# Patient Record
Sex: MALE | Race: WHITE | NOT HISPANIC OR LATINO | Employment: OTHER | URBAN - METROPOLITAN AREA
[De-identification: names, ages, dates, MRNs, and addresses within clinical notes are randomized per-mention and may not be internally consistent; named-entity substitution may affect disease eponyms.]

---

## 2021-03-30 ENCOUNTER — HOSPITAL ENCOUNTER (INPATIENT)
Facility: HOSPITAL | Age: 61
LOS: 1 days | Discharge: HOME | End: 2021-03-31
Attending: EMERGENCY MEDICINE | Admitting: INTERNAL MEDICINE

## 2021-03-30 DIAGNOSIS — K92.2 GASTROINTESTINAL HEMORRHAGE, UNSPECIFIED GASTROINTESTINAL HEMORRHAGE TYPE: Primary | ICD-10-CM

## 2021-03-30 DIAGNOSIS — F10.10 ETOH ABUSE: ICD-10-CM

## 2021-03-30 LAB
ABO GROUP BLD: NORMAL
ALBUMIN SERPL-MCNC: 3.2 G/DL (ref 3.5–5.2)
ALBUMIN/GLOB SERPL: 0.9 G/DL
ALP SERPL-CCNC: 73 U/L (ref 39–117)
ALT SERPL W P-5'-P-CCNC: 56 U/L (ref 1–41)
ANION GAP SERPL CALCULATED.3IONS-SCNC: 11 MMOL/L (ref 5–15)
APTT PPP: 23.1 SECONDS (ref 24–31)
AST SERPL-CCNC: 74 U/L (ref 1–40)
BASOPHILS # BLD AUTO: 0 10*3/MM3 (ref 0–0.2)
BASOPHILS NFR BLD AUTO: 0.1 % (ref 0–1.5)
BILIRUB SERPL-MCNC: 3.5 MG/DL (ref 0–1.2)
BLD GP AB SCN SERPL QL: NEGATIVE
BUN SERPL-MCNC: 31 MG/DL (ref 8–23)
BUN/CREAT SERPL: 52.5 (ref 7–25)
CALCIUM SPEC-SCNC: 8.6 MG/DL (ref 8.6–10.5)
CHLORIDE SERPL-SCNC: 106 MMOL/L (ref 98–107)
CO2 SERPL-SCNC: 23 MMOL/L (ref 22–29)
CREAT SERPL-MCNC: 0.59 MG/DL (ref 0.76–1.27)
DEPRECATED RDW RBC AUTO: 51.2 FL (ref 37–54)
EOSINOPHIL # BLD AUTO: 0 10*3/MM3 (ref 0–0.4)
EOSINOPHIL NFR BLD AUTO: 0 % (ref 0.3–6.2)
ERYTHROCYTE [DISTWIDTH] IN BLOOD BY AUTOMATED COUNT: 14.4 % (ref 12.3–15.4)
ETHANOL UR QL: <0.01 %
GFR SERPL CREATININE-BSD FRML MDRD: 140 ML/MIN/1.73
GFR SERPL CREATININE-BSD FRML MDRD: >150 ML/MIN/1.73
GLOBULIN UR ELPH-MCNC: 3.4 GM/DL
GLUCOSE SERPL-MCNC: 168 MG/DL (ref 65–99)
HCT VFR BLD AUTO: 31.4 % (ref 37.5–51)
HGB BLD-MCNC: 11 G/DL (ref 13–17.7)
INR PPP: 1.21 (ref 0.93–1.1)
LIPASE SERPL-CCNC: 18 U/L (ref 13–60)
LYMPHOCYTES # BLD AUTO: 1 10*3/MM3 (ref 0.7–3.1)
LYMPHOCYTES NFR BLD AUTO: 9.2 % (ref 19.6–45.3)
MAGNESIUM SERPL-MCNC: 1.6 MG/DL (ref 1.6–2.4)
MCH RBC QN AUTO: 35.2 PG (ref 26.6–33)
MCHC RBC AUTO-ENTMCNC: 35.1 G/DL (ref 31.5–35.7)
MCV RBC AUTO: 100.2 FL (ref 79–97)
MONOCYTES # BLD AUTO: 0.9 10*3/MM3 (ref 0.1–0.9)
MONOCYTES NFR BLD AUTO: 8.2 % (ref 5–12)
NEUTROPHILS NFR BLD AUTO: 82.5 % (ref 42.7–76)
NEUTROPHILS NFR BLD AUTO: 9.1 10*3/MM3 (ref 1.7–7)
NRBC BLD AUTO-RTO: 0.1 /100 WBC (ref 0–0.2)
PLATELET # BLD AUTO: 111 10*3/MM3 (ref 140–450)
PMV BLD AUTO: 10.1 FL (ref 6–12)
POTASSIUM SERPL-SCNC: 4.3 MMOL/L (ref 3.5–5.2)
PROT SERPL-MCNC: 6.6 G/DL (ref 6–8.5)
PROTHROMBIN TIME: 13.2 SECONDS (ref 9.6–11.7)
RBC # BLD AUTO: 3.13 10*6/MM3 (ref 4.14–5.8)
RH BLD: POSITIVE
SODIUM SERPL-SCNC: 140 MMOL/L (ref 136–145)
T&S EXPIRATION DATE: NORMAL
WBC # BLD AUTO: 11.1 10*3/MM3 (ref 3.4–10.8)

## 2021-03-30 PROCEDURE — 25010000002 OCTREOTIDE PER 25 MCG: Performed by: EMERGENCY MEDICINE

## 2021-03-30 PROCEDURE — 99222 1ST HOSP IP/OBS MODERATE 55: CPT | Performed by: NURSE PRACTITIONER

## 2021-03-30 PROCEDURE — 99284 EMERGENCY DEPT VISIT MOD MDM: CPT

## 2021-03-30 PROCEDURE — 80074 ACUTE HEPATITIS PANEL: CPT | Performed by: NURSE PRACTITIONER

## 2021-03-30 PROCEDURE — 82105 ALPHA-FETOPROTEIN SERUM: CPT | Performed by: NURSE PRACTITIONER

## 2021-03-30 PROCEDURE — 83735 ASSAY OF MAGNESIUM: CPT | Performed by: EMERGENCY MEDICINE

## 2021-03-30 PROCEDURE — 86850 RBC ANTIBODY SCREEN: CPT | Performed by: EMERGENCY MEDICINE

## 2021-03-30 PROCEDURE — 85730 THROMBOPLASTIN TIME PARTIAL: CPT | Performed by: EMERGENCY MEDICINE

## 2021-03-30 PROCEDURE — 25010000002 CEFTRIAXONE PER 250 MG: Performed by: EMERGENCY MEDICINE

## 2021-03-30 PROCEDURE — 25010000002 THIAMINE PER 100 MG: Performed by: EMERGENCY MEDICINE

## 2021-03-30 PROCEDURE — 25010000002 METOCLOPRAMIDE PER 10 MG: Performed by: EMERGENCY MEDICINE

## 2021-03-30 PROCEDURE — 86900 BLOOD TYPING SEROLOGIC ABO: CPT | Performed by: EMERGENCY MEDICINE

## 2021-03-30 PROCEDURE — 86901 BLOOD TYPING SEROLOGIC RH(D): CPT | Performed by: EMERGENCY MEDICINE

## 2021-03-30 PROCEDURE — 83690 ASSAY OF LIPASE: CPT | Performed by: EMERGENCY MEDICINE

## 2021-03-30 PROCEDURE — 86038 ANTINUCLEAR ANTIBODIES: CPT | Performed by: NURSE PRACTITIONER

## 2021-03-30 PROCEDURE — U0003 INFECTIOUS AGENT DETECTION BY NUCLEIC ACID (DNA OR RNA); SEVERE ACUTE RESPIRATORY SYNDROME CORONAVIRUS 2 (SARS-COV-2) (CORONAVIRUS DISEASE [COVID-19]), AMPLIFIED PROBE TECHNIQUE, MAKING USE OF HIGH THROUGHPUT TECHNOLOGIES AS DESCRIBED BY CMS-2020-01-R: HCPCS | Performed by: INTERNAL MEDICINE

## 2021-03-30 PROCEDURE — 82077 ASSAY SPEC XCP UR&BREATH IA: CPT | Performed by: EMERGENCY MEDICINE

## 2021-03-30 PROCEDURE — 85610 PROTHROMBIN TIME: CPT | Performed by: EMERGENCY MEDICINE

## 2021-03-30 PROCEDURE — 80053 COMPREHEN METABOLIC PANEL: CPT | Performed by: EMERGENCY MEDICINE

## 2021-03-30 PROCEDURE — 86900 BLOOD TYPING SEROLOGIC ABO: CPT

## 2021-03-30 PROCEDURE — 85025 COMPLETE CBC W/AUTO DIFF WBC: CPT | Performed by: EMERGENCY MEDICINE

## 2021-03-30 PROCEDURE — 86901 BLOOD TYPING SEROLOGIC RH(D): CPT

## 2021-03-30 RX ORDER — SODIUM CHLORIDE 0.9 % (FLUSH) 0.9 %
10 SYRINGE (ML) INJECTION EVERY 12 HOURS SCHEDULED
Status: DISCONTINUED | OUTPATIENT
Start: 2021-03-31 | End: 2021-03-31 | Stop reason: HOSPADM

## 2021-03-30 RX ORDER — ALUMINA, MAGNESIA, AND SIMETHICONE 2400; 2400; 240 MG/30ML; MG/30ML; MG/30ML
15 SUSPENSION ORAL EVERY 6 HOURS PRN
Status: DISCONTINUED | OUTPATIENT
Start: 2021-03-30 | End: 2021-03-31 | Stop reason: HOSPADM

## 2021-03-30 RX ORDER — MAGNESIUM SULFATE HEPTAHYDRATE 40 MG/ML
2 INJECTION, SOLUTION INTRAVENOUS AS NEEDED
Status: DISCONTINUED | OUTPATIENT
Start: 2021-03-30 | End: 2021-03-31 | Stop reason: HOSPADM

## 2021-03-30 RX ORDER — POTASSIUM CHLORIDE 20 MEQ/1
40 TABLET, EXTENDED RELEASE ORAL AS NEEDED
Status: DISCONTINUED | OUTPATIENT
Start: 2021-03-30 | End: 2021-03-31 | Stop reason: HOSPADM

## 2021-03-30 RX ORDER — MAGNESIUM SULFATE 1 G/100ML
1 INJECTION INTRAVENOUS AS NEEDED
Status: DISCONTINUED | OUTPATIENT
Start: 2021-03-30 | End: 2021-03-31 | Stop reason: HOSPADM

## 2021-03-30 RX ORDER — OCTREOTIDE ACETATE 500 UG/ML
50 INJECTION, SOLUTION INTRAVENOUS; SUBCUTANEOUS ONCE
Status: COMPLETED | OUTPATIENT
Start: 2021-03-30 | End: 2021-03-30

## 2021-03-30 RX ORDER — SODIUM CHLORIDE 0.9 % (FLUSH) 0.9 %
10 SYRINGE (ML) INJECTION AS NEEDED
Status: DISCONTINUED | OUTPATIENT
Start: 2021-03-30 | End: 2021-03-31 | Stop reason: HOSPADM

## 2021-03-30 RX ORDER — LORAZEPAM 1 MG/1
1 TABLET ORAL EVERY 8 HOURS
Status: DISCONTINUED | OUTPATIENT
Start: 2021-04-01 | End: 2021-03-31

## 2021-03-30 RX ORDER — LABETALOL HYDROCHLORIDE 5 MG/ML
10 INJECTION, SOLUTION INTRAVENOUS EVERY 6 HOURS PRN
Status: DISCONTINUED | OUTPATIENT
Start: 2021-03-30 | End: 2021-03-31 | Stop reason: HOSPADM

## 2021-03-30 RX ORDER — ACETAMINOPHEN 650 MG/1
650 SUPPOSITORY RECTAL EVERY 4 HOURS PRN
Status: DISCONTINUED | OUTPATIENT
Start: 2021-03-30 | End: 2021-03-31 | Stop reason: HOSPADM

## 2021-03-30 RX ORDER — PANTOPRAZOLE SODIUM 40 MG/10ML
80 INJECTION, POWDER, LYOPHILIZED, FOR SOLUTION INTRAVENOUS ONCE
Status: COMPLETED | OUTPATIENT
Start: 2021-03-30 | End: 2021-03-30

## 2021-03-30 RX ORDER — NITROGLYCERIN 0.4 MG/1
0.4 TABLET SUBLINGUAL
Status: DISCONTINUED | OUTPATIENT
Start: 2021-03-30 | End: 2021-03-31 | Stop reason: HOSPADM

## 2021-03-30 RX ORDER — METOCLOPRAMIDE HYDROCHLORIDE 5 MG/ML
10 INJECTION INTRAMUSCULAR; INTRAVENOUS ONCE
Status: COMPLETED | OUTPATIENT
Start: 2021-03-30 | End: 2021-03-30

## 2021-03-30 RX ORDER — LORAZEPAM 1 MG/1
1 TABLET ORAL EVERY 6 HOURS
Status: DISCONTINUED | OUTPATIENT
Start: 2021-03-30 | End: 2021-03-31

## 2021-03-30 RX ORDER — ONDANSETRON 4 MG/1
4 TABLET, FILM COATED ORAL EVERY 6 HOURS PRN
Status: DISCONTINUED | OUTPATIENT
Start: 2021-03-30 | End: 2021-03-31 | Stop reason: HOSPADM

## 2021-03-30 RX ORDER — ACETAMINOPHEN 325 MG/1
650 TABLET ORAL EVERY 4 HOURS PRN
Status: DISCONTINUED | OUTPATIENT
Start: 2021-03-30 | End: 2021-03-31 | Stop reason: HOSPADM

## 2021-03-30 RX ORDER — ACETAMINOPHEN 160 MG/5ML
650 SOLUTION ORAL EVERY 4 HOURS PRN
Status: DISCONTINUED | OUTPATIENT
Start: 2021-03-30 | End: 2021-03-31 | Stop reason: HOSPADM

## 2021-03-30 RX ORDER — PANTOPRAZOLE SODIUM 40 MG/10ML
40 INJECTION, POWDER, LYOPHILIZED, FOR SOLUTION INTRAVENOUS EVERY 12 HOURS SCHEDULED
Status: DISCONTINUED | OUTPATIENT
Start: 2021-03-30 | End: 2021-03-31

## 2021-03-30 RX ORDER — ONDANSETRON 2 MG/ML
4 INJECTION INTRAMUSCULAR; INTRAVENOUS EVERY 6 HOURS PRN
Status: DISCONTINUED | OUTPATIENT
Start: 2021-03-30 | End: 2021-03-31 | Stop reason: HOSPADM

## 2021-03-30 RX ADMIN — OCTREOTIDE ACETATE 25 MCG/HR: 500 INJECTION, SOLUTION INTRAVENOUS; SUBCUTANEOUS at 22:44

## 2021-03-30 RX ADMIN — OCTREOTIDE ACETATE 50 MCG: 500 INJECTION, SOLUTION INTRAVENOUS; SUBCUTANEOUS at 20:24

## 2021-03-30 RX ADMIN — PANTOPRAZOLE SODIUM 80 MG: 40 INJECTION, POWDER, FOR SOLUTION INTRAVENOUS at 20:24

## 2021-03-30 RX ADMIN — THIAMINE HYDROCHLORIDE 1000 ML/HR: 100 INJECTION, SOLUTION INTRAMUSCULAR; INTRAVENOUS at 21:02

## 2021-03-30 RX ADMIN — METOCLOPRAMIDE HYDROCHLORIDE 10 MG: 5 INJECTION INTRAMUSCULAR; INTRAVENOUS at 22:15

## 2021-03-30 RX ADMIN — LORAZEPAM 1 MG: 1 TABLET ORAL at 23:48

## 2021-03-30 RX ADMIN — Medication 10 ML: at 23:49

## 2021-03-30 RX ADMIN — WATER 2 G: 100 INJECTION, SOLUTION INTRAVENOUS at 22:12

## 2021-03-31 ENCOUNTER — ANESTHESIA EVENT (OUTPATIENT)
Dept: GASTROENTEROLOGY | Facility: HOSPITAL | Age: 61
End: 2021-03-31

## 2021-03-31 ENCOUNTER — APPOINTMENT (OUTPATIENT)
Dept: GENERAL RADIOLOGY | Facility: HOSPITAL | Age: 61
End: 2021-03-31

## 2021-03-31 ENCOUNTER — APPOINTMENT (OUTPATIENT)
Dept: ULTRASOUND IMAGING | Facility: HOSPITAL | Age: 61
End: 2021-03-31

## 2021-03-31 ENCOUNTER — ON CAMPUS - OUTPATIENT (OUTPATIENT)
Dept: URBAN - METROPOLITAN AREA HOSPITAL 85 | Facility: HOSPITAL | Age: 61
End: 2021-03-31
Payer: MEDICAID

## 2021-03-31 ENCOUNTER — ANESTHESIA (OUTPATIENT)
Dept: GASTROENTEROLOGY | Facility: HOSPITAL | Age: 61
End: 2021-03-31

## 2021-03-31 VITALS
BODY MASS INDEX: 33.01 KG/M2 | HEIGHT: 72 IN | HEART RATE: 98 BPM | SYSTOLIC BLOOD PRESSURE: 125 MMHG | WEIGHT: 243.7 LBS | OXYGEN SATURATION: 95 % | DIASTOLIC BLOOD PRESSURE: 80 MMHG | RESPIRATION RATE: 20 BRPM | TEMPERATURE: 98.2 F

## 2021-03-31 DIAGNOSIS — I85.00 ESOPHAGEAL VARICES WITHOUT BLEEDING: ICD-10-CM

## 2021-03-31 DIAGNOSIS — K92.0 HEMATEMESIS: ICD-10-CM

## 2021-03-31 DIAGNOSIS — K76.6 PORTAL HYPERTENSION: ICD-10-CM

## 2021-03-31 DIAGNOSIS — R74.8 ABNORMAL LEVELS OF OTHER SERUM ENZYMES: ICD-10-CM

## 2021-03-31 PROBLEM — F10.10 ETOH ABUSE: Status: ACTIVE | Noted: 2021-03-31

## 2021-03-31 PROBLEM — Z72.0 TOBACCO ABUSE: Status: ACTIVE | Noted: 2021-03-31

## 2021-03-31 PROBLEM — Z86.19 HISTORY OF HEPATITIS C: Status: ACTIVE | Noted: 2021-03-31

## 2021-03-31 PROBLEM — R06.09 DYSPNEA ON MINIMAL EXERTION: Status: ACTIVE | Noted: 2021-03-31

## 2021-03-31 PROBLEM — I85.11 SECONDARY ESOPHAGEAL VARICES WITH BLEEDING: Chronic | Status: ACTIVE | Noted: 2021-03-31

## 2021-03-31 PROBLEM — E66.9 OBESITY (BMI 30-39.9): Chronic | Status: ACTIVE | Noted: 2021-03-31

## 2021-03-31 LAB
ALBUMIN SERPL-MCNC: 2.8 G/DL (ref 3.5–5.2)
ALBUMIN/GLOB SERPL: 0.9 G/DL
ALP SERPL-CCNC: 67 U/L (ref 39–117)
ALPHA-FETOPROTEIN: 4.96 NG/ML (ref 0–8.3)
ALPHA1 GLOB MFR UR ELPH: 137 MG/DL (ref 90–200)
ALT SERPL W P-5'-P-CCNC: 50 U/L (ref 1–41)
AMMONIA BLD-SCNC: 133 UMOL/L (ref 16–60)
AMPHET+METHAMPHET UR QL: NEGATIVE
ANION GAP SERPL CALCULATED.3IONS-SCNC: 8 MMOL/L (ref 5–15)
AST SERPL-CCNC: 66 U/L (ref 1–40)
BACTERIA UR QL AUTO: ABNORMAL /HPF
BARBITURATES UR QL SCN: NEGATIVE
BASOPHILS # BLD AUTO: 0 10*3/MM3 (ref 0–0.2)
BASOPHILS NFR BLD AUTO: 0.2 % (ref 0–1.5)
BENZODIAZ UR QL SCN: NEGATIVE
BILIRUB SERPL-MCNC: 2.5 MG/DL (ref 0–1.2)
BILIRUB UR QL STRIP: ABNORMAL
BUN SERPL-MCNC: 34 MG/DL (ref 8–23)
BUN/CREAT SERPL: 45.9 (ref 7–25)
CALCIUM SPEC-SCNC: 7.9 MG/DL (ref 8.6–10.5)
CANNABINOIDS SERPL QL: POSITIVE
CERULOPLASMIN SERPL-MCNC: 17 MG/DL (ref 16–31)
CHLORIDE SERPL-SCNC: 108 MMOL/L (ref 98–107)
CLARITY UR: CLEAR
CO2 SERPL-SCNC: 26 MMOL/L (ref 22–29)
COCAINE UR QL: NEGATIVE
COLOR UR: ABNORMAL
CREAT SERPL-MCNC: 0.74 MG/DL (ref 0.76–1.27)
D-LACTATE SERPL-SCNC: 1.5 MMOL/L (ref 0.5–2)
D-LACTATE SERPL-SCNC: 1.7 MMOL/L (ref 0.5–2)
D-LACTATE SERPL-SCNC: 1.8 MMOL/L (ref 0.5–2)
D-LACTATE SERPL-SCNC: 2.4 MMOL/L (ref 0.5–2)
DEPRECATED RDW RBC AUTO: 51.6 FL (ref 37–54)
EOSINOPHIL # BLD AUTO: 0.1 10*3/MM3 (ref 0–0.4)
EOSINOPHIL NFR BLD AUTO: 0.8 % (ref 0.3–6.2)
ERYTHROCYTE [DISTWIDTH] IN BLOOD BY AUTOMATED COUNT: 14.5 % (ref 12.3–15.4)
FERRITIN SERPL-MCNC: 660.6 NG/ML (ref 30–400)
GFR SERPL CREATININE-BSD FRML MDRD: 108 ML/MIN/1.73
GGT SERPL-CCNC: 90 U/L (ref 8–61)
GLOBULIN UR ELPH-MCNC: 3.2 GM/DL
GLUCOSE SERPL-MCNC: 152 MG/DL (ref 65–99)
GLUCOSE UR STRIP-MCNC: NEGATIVE MG/DL
HAV IGM SERPL QL IA: ABNORMAL
HBA1C MFR BLD: 4.7 % (ref 3.5–5.6)
HBV CORE IGM SERPL QL IA: ABNORMAL
HBV SURFACE AG SERPL QL IA: ABNORMAL
HCT VFR BLD AUTO: 28.6 % (ref 37.5–51)
HCT VFR BLD AUTO: 28.8 % (ref 37.5–51)
HCV AB SER DONR QL: REACTIVE
HGB BLD-MCNC: 10 G/DL (ref 13–17.7)
HGB BLD-MCNC: 10.1 G/DL (ref 13–17.7)
HGB BLD-MCNC: 10.2 G/DL (ref 13–17.7)
HGB BLD-MCNC: 9.4 G/DL (ref 13–17.7)
HGB UR QL STRIP.AUTO: NEGATIVE
HYALINE CASTS UR QL AUTO: ABNORMAL /LPF
INR PPP: 1.21 (ref 0.93–1.1)
KETONES UR QL STRIP: ABNORMAL
LEUKOCYTE ESTERASE UR QL STRIP.AUTO: ABNORMAL
LYMPHOCYTES # BLD AUTO: 1.3 10*3/MM3 (ref 0.7–3.1)
LYMPHOCYTES NFR BLD AUTO: 14.9 % (ref 19.6–45.3)
MAGNESIUM SERPL-MCNC: 1.9 MG/DL (ref 1.6–2.4)
MCH RBC QN AUTO: 36.2 PG (ref 26.6–33)
MCHC RBC AUTO-ENTMCNC: 35.5 G/DL (ref 31.5–35.7)
MCV RBC AUTO: 101.9 FL (ref 79–97)
METHADONE UR QL SCN: NEGATIVE
MONOCYTES # BLD AUTO: 1 10*3/MM3 (ref 0.1–0.9)
MONOCYTES NFR BLD AUTO: 10.8 % (ref 5–12)
NEUTROPHILS NFR BLD AUTO: 6.6 10*3/MM3 (ref 1.7–7)
NEUTROPHILS NFR BLD AUTO: 73.3 % (ref 42.7–76)
NITRITE UR QL STRIP: NEGATIVE
NRBC BLD AUTO-RTO: 0 /100 WBC (ref 0–0.2)
OPIATES UR QL: NEGATIVE
OXYCODONE UR QL SCN: NEGATIVE
PH UR STRIP.AUTO: 6.5 [PH] (ref 5–8)
PLATELET # BLD AUTO: 104 10*3/MM3 (ref 140–450)
PMV BLD AUTO: 9.8 FL (ref 6–12)
POTASSIUM SERPL-SCNC: 4.2 MMOL/L (ref 3.5–5.2)
PROT SERPL-MCNC: 6 G/DL (ref 6–8.5)
PROT UR QL STRIP: NEGATIVE
PROTHROMBIN TIME: 13.2 SECONDS (ref 9.6–11.7)
RBC # BLD AUTO: 2.81 10*6/MM3 (ref 4.14–5.8)
RBC # UR: ABNORMAL /HPF
REF LAB TEST METHOD: ABNORMAL
SARS-COV-2 RNA PNL SPEC NAA+PROBE: NOT DETECTED
SODIUM SERPL-SCNC: 142 MMOL/L (ref 136–145)
SP GR UR STRIP: 1.03 (ref 1–1.03)
SQUAMOUS #/AREA URNS HPF: ABNORMAL /HPF
TROPONIN T SERPL-MCNC: <0.01 NG/ML (ref 0–0.03)
UROBILINOGEN UR QL STRIP: ABNORMAL
WBC # BLD AUTO: 9 10*3/MM3 (ref 3.4–10.8)
WBC UR QL AUTO: ABNORMAL /HPF

## 2021-03-31 PROCEDURE — 82977 ASSAY OF GGT: CPT | Performed by: NURSE PRACTITIONER

## 2021-03-31 PROCEDURE — 93005 ELECTROCARDIOGRAM TRACING: CPT | Performed by: NURSE PRACTITIONER

## 2021-03-31 PROCEDURE — 25010000002 OCTREOTIDE PER 25 MCG: Performed by: INTERNAL MEDICINE

## 2021-03-31 PROCEDURE — 80053 COMPREHEN METABOLIC PANEL: CPT | Performed by: NURSE PRACTITIONER

## 2021-03-31 PROCEDURE — 82103 ALPHA-1-ANTITRYPSIN TOTAL: CPT | Performed by: NURSE PRACTITIONER

## 2021-03-31 PROCEDURE — 83605 ASSAY OF LACTIC ACID: CPT | Performed by: INTERNAL MEDICINE

## 2021-03-31 PROCEDURE — 87902 NFCT AGT GNTYP ALYS HEP C: CPT | Performed by: NURSE PRACTITIONER

## 2021-03-31 PROCEDURE — 85018 HEMOGLOBIN: CPT | Performed by: NURSE PRACTITIONER

## 2021-03-31 PROCEDURE — 83735 ASSAY OF MAGNESIUM: CPT | Performed by: NURSE PRACTITIONER

## 2021-03-31 PROCEDURE — 80307 DRUG TEST PRSMV CHEM ANLYZR: CPT | Performed by: NURSE PRACTITIONER

## 2021-03-31 PROCEDURE — 83516 IMMUNOASSAY NONANTIBODY: CPT | Performed by: NURSE PRACTITIONER

## 2021-03-31 PROCEDURE — 25010000002 PROPOFOL 10 MG/ML EMULSION: Performed by: NURSE ANESTHETIST, CERTIFIED REGISTERED

## 2021-03-31 PROCEDURE — 83605 ASSAY OF LACTIC ACID: CPT | Performed by: NURSE PRACTITIONER

## 2021-03-31 PROCEDURE — 85025 COMPLETE CBC W/AUTO DIFF WBC: CPT | Performed by: NURSE PRACTITIONER

## 2021-03-31 PROCEDURE — 99239 HOSP IP/OBS DSCHRG MGMT >30: CPT | Performed by: INTERNAL MEDICINE

## 2021-03-31 PROCEDURE — 43244 EGD VARICES LIGATION: CPT | Performed by: INTERNAL MEDICINE

## 2021-03-31 PROCEDURE — 84484 ASSAY OF TROPONIN QUANT: CPT | Performed by: NURSE PRACTITIONER

## 2021-03-31 PROCEDURE — 82140 ASSAY OF AMMONIA: CPT | Performed by: NURSE PRACTITIONER

## 2021-03-31 PROCEDURE — 82390 ASSAY OF CERULOPLASMIN: CPT | Performed by: NURSE PRACTITIONER

## 2021-03-31 PROCEDURE — 71045 X-RAY EXAM CHEST 1 VIEW: CPT

## 2021-03-31 PROCEDURE — 81001 URINALYSIS AUTO W/SCOPE: CPT | Performed by: NURSE PRACTITIONER

## 2021-03-31 PROCEDURE — 82728 ASSAY OF FERRITIN: CPT | Performed by: NURSE PRACTITIONER

## 2021-03-31 PROCEDURE — 85610 PROTHROMBIN TIME: CPT | Performed by: NURSE PRACTITIONER

## 2021-03-31 PROCEDURE — 94640 AIRWAY INHALATION TREATMENT: CPT

## 2021-03-31 PROCEDURE — 93010 ELECTROCARDIOGRAM REPORT: CPT | Performed by: INTERNAL MEDICINE

## 2021-03-31 PROCEDURE — 25010000002 THIAMINE PER 100 MG: Performed by: NURSE PRACTITIONER

## 2021-03-31 PROCEDURE — 83036 HEMOGLOBIN GLYCOSYLATED A1C: CPT | Performed by: NURSE PRACTITIONER

## 2021-03-31 PROCEDURE — 87522 HEPATITIS C REVRS TRNSCRPJ: CPT | Performed by: NURSE PRACTITIONER

## 2021-03-31 PROCEDURE — 85018 HEMOGLOBIN: CPT | Performed by: INTERNAL MEDICINE

## 2021-03-31 PROCEDURE — 94799 UNLISTED PULMONARY SVC/PX: CPT

## 2021-03-31 PROCEDURE — 76705 ECHO EXAM OF ABDOMEN: CPT

## 2021-03-31 PROCEDURE — 06L38CZ OCCLUSION OF ESOPHAGEAL VEIN WITH EXTRALUMINAL DEVICE, VIA NATURAL OR ARTIFICIAL OPENING ENDOSCOPIC: ICD-10-PCS | Performed by: INTERNAL MEDICINE

## 2021-03-31 PROCEDURE — 85014 HEMATOCRIT: CPT | Performed by: INTERNAL MEDICINE

## 2021-03-31 RX ORDER — DEXTROSE AND SODIUM CHLORIDE 5; .9 G/100ML; G/100ML
100 INJECTION, SOLUTION INTRAVENOUS CONTINUOUS
Status: DISCONTINUED | OUTPATIENT
Start: 2021-03-31 | End: 2021-03-31 | Stop reason: HOSPADM

## 2021-03-31 RX ORDER — PROPRANOLOL HYDROCHLORIDE 20 MG/1
20 TABLET ORAL 2 TIMES DAILY
Qty: 60 TABLET | Refills: 0 | Status: SHIPPED | OUTPATIENT
Start: 2021-03-31 | End: 2022-06-22 | Stop reason: SDUPTHER

## 2021-03-31 RX ORDER — SODIUM CHLORIDE 0.9 % (FLUSH) 0.9 %
3-10 SYRINGE (ML) INJECTION AS NEEDED
Status: DISCONTINUED | OUTPATIENT
Start: 2021-03-31 | End: 2021-03-31 | Stop reason: HOSPADM

## 2021-03-31 RX ORDER — PANTOPRAZOLE SODIUM 40 MG/1
40 TABLET, DELAYED RELEASE ORAL
Qty: 60 TABLET | Refills: 0 | Status: SHIPPED | OUTPATIENT
Start: 2021-03-31 | End: 2021-04-30

## 2021-03-31 RX ORDER — NADOLOL 20 MG/1
20 TABLET ORAL
Status: DISCONTINUED | OUTPATIENT
Start: 2021-03-31 | End: 2021-03-31 | Stop reason: HOSPADM

## 2021-03-31 RX ORDER — SODIUM CHLORIDE 9 MG/ML
INJECTION, SOLUTION INTRAVENOUS CONTINUOUS PRN
Status: DISCONTINUED | OUTPATIENT
Start: 2021-03-31 | End: 2021-03-31 | Stop reason: SURG

## 2021-03-31 RX ORDER — ZINC SULFATE 50(220)MG
220 CAPSULE ORAL DAILY
Qty: 30 CAPSULE | Refills: 0 | Status: SHIPPED | OUTPATIENT
Start: 2021-04-01 | End: 2021-05-01

## 2021-03-31 RX ORDER — LACTULOSE 10 G/15ML
20 SOLUTION ORAL 3 TIMES DAILY
Qty: 2700 ML | Refills: 0 | Status: SHIPPED | OUTPATIENT
Start: 2021-03-31 | End: 2021-04-30

## 2021-03-31 RX ORDER — LEVOFLOXACIN 500 MG/1
500 TABLET, FILM COATED ORAL
Qty: 6 TABLET | Refills: 0 | Status: SHIPPED | OUTPATIENT
Start: 2021-04-01 | End: 2021-04-07

## 2021-03-31 RX ORDER — IPRATROPIUM BROMIDE AND ALBUTEROL SULFATE 2.5; .5 MG/3ML; MG/3ML
3 SOLUTION RESPIRATORY (INHALATION)
Status: DISCONTINUED | OUTPATIENT
Start: 2021-03-31 | End: 2021-03-31 | Stop reason: HOSPADM

## 2021-03-31 RX ORDER — LORAZEPAM 0.5 MG/1
0.5 TABLET ORAL EVERY 6 HOURS PRN
Status: DISCONTINUED | OUTPATIENT
Start: 2021-03-31 | End: 2021-03-31 | Stop reason: HOSPADM

## 2021-03-31 RX ORDER — LACTULOSE 10 G/15ML
20 SOLUTION ORAL 3 TIMES DAILY
Status: DISCONTINUED | OUTPATIENT
Start: 2021-03-31 | End: 2021-03-31 | Stop reason: HOSPADM

## 2021-03-31 RX ORDER — PANTOPRAZOLE SODIUM 40 MG/1
40 TABLET, DELAYED RELEASE ORAL
Status: DISCONTINUED | OUTPATIENT
Start: 2021-03-31 | End: 2021-03-31 | Stop reason: HOSPADM

## 2021-03-31 RX ORDER — ZINC SULFATE 50(220)MG
220 CAPSULE ORAL DAILY
Status: DISCONTINUED | OUTPATIENT
Start: 2021-03-31 | End: 2021-03-31 | Stop reason: HOSPADM

## 2021-03-31 RX ORDER — NADOLOL 20 MG/1
20 TABLET ORAL
Qty: 30 TABLET | Refills: 0 | Status: SHIPPED | OUTPATIENT
Start: 2021-04-01 | End: 2021-03-31

## 2021-03-31 RX ORDER — ALBUTEROL SULFATE 90 UG/1
2 AEROSOL, METERED RESPIRATORY (INHALATION) EVERY 4 HOURS PRN
Qty: 8.5 G | Refills: 0 | Status: SHIPPED | OUTPATIENT
Start: 2021-04-01 | End: 2022-06-22 | Stop reason: SDUPTHER

## 2021-03-31 RX ORDER — SODIUM CHLORIDE 0.9 % (FLUSH) 0.9 %
3 SYRINGE (ML) INJECTION EVERY 12 HOURS SCHEDULED
Status: DISCONTINUED | OUTPATIENT
Start: 2021-03-31 | End: 2021-03-31 | Stop reason: HOSPADM

## 2021-03-31 RX ORDER — LEVOFLOXACIN 500 MG/1
500 TABLET, FILM COATED ORAL
Status: DISCONTINUED | OUTPATIENT
Start: 2021-03-31 | End: 2021-03-31 | Stop reason: HOSPADM

## 2021-03-31 RX ORDER — SPIRONOLACTONE 100 MG/1
100 TABLET, FILM COATED ORAL DAILY
Qty: 30 TABLET | Refills: 0 | Status: SHIPPED | OUTPATIENT
Start: 2021-04-01 | End: 2022-06-22 | Stop reason: SDUPTHER

## 2021-03-31 RX ORDER — LIDOCAINE HYDROCHLORIDE 10 MG/ML
INJECTION, SOLUTION EPIDURAL; INFILTRATION; INTRACAUDAL; PERINEURAL AS NEEDED
Status: DISCONTINUED | OUTPATIENT
Start: 2021-03-31 | End: 2021-03-31 | Stop reason: SURG

## 2021-03-31 RX ORDER — PROPOFOL 10 MG/ML
VIAL (ML) INTRAVENOUS AS NEEDED
Status: DISCONTINUED | OUTPATIENT
Start: 2021-03-31 | End: 2021-03-31 | Stop reason: SURG

## 2021-03-31 RX ORDER — SPIRONOLACTONE 100 MG/1
100 TABLET, FILM COATED ORAL DAILY
Status: DISCONTINUED | OUTPATIENT
Start: 2021-03-31 | End: 2021-03-31 | Stop reason: HOSPADM

## 2021-03-31 RX ORDER — LORAZEPAM 1 MG/1
1 TABLET ORAL EVERY 6 HOURS PRN
Status: DISCONTINUED | OUTPATIENT
Start: 2021-04-03 | End: 2021-03-31 | Stop reason: HOSPADM

## 2021-03-31 RX ORDER — CHLORDIAZEPOXIDE HYDROCHLORIDE 5 MG/1
5 CAPSULE, GELATIN COATED ORAL 3 TIMES DAILY
Qty: 15 CAPSULE | Refills: 0 | Status: SHIPPED | OUTPATIENT
Start: 2021-03-31 | End: 2022-06-22

## 2021-03-31 RX ADMIN — Medication 10 ML: at 09:05

## 2021-03-31 RX ADMIN — OCTREOTIDE ACETATE 25 MCG/HR: 500 INJECTION, SOLUTION INTRAVENOUS; SUBCUTANEOUS at 15:12

## 2021-03-31 RX ADMIN — LORAZEPAM 1 MG: 1 TABLET ORAL at 09:05

## 2021-03-31 RX ADMIN — ZINC SULFATE 220 MG (50 MG) CAPSULE 220 MG: CAPSULE at 09:05

## 2021-03-31 RX ADMIN — NADOLOL 20 MG: 20 TABLET ORAL at 12:30

## 2021-03-31 RX ADMIN — IPRATROPIUM BROMIDE AND ALBUTEROL SULFATE 3 ML: 2.5; .5 SOLUTION RESPIRATORY (INHALATION) at 12:48

## 2021-03-31 RX ADMIN — DEXTROSE AND SODIUM CHLORIDE 100 ML/HR: 5; 900 INJECTION, SOLUTION INTRAVENOUS at 07:46

## 2021-03-31 RX ADMIN — PROPOFOL 100 MG: 10 INJECTION, EMULSION INTRAVENOUS at 10:22

## 2021-03-31 RX ADMIN — LEVOFLOXACIN 500 MG: 500 TABLET, FILM COATED ORAL at 12:33

## 2021-03-31 RX ADMIN — IPRATROPIUM BROMIDE AND ALBUTEROL SULFATE 3 ML: 2.5; .5 SOLUTION RESPIRATORY (INHALATION) at 15:54

## 2021-03-31 RX ADMIN — PROPOFOL 50 MG: 10 INJECTION, EMULSION INTRAVENOUS at 10:24

## 2021-03-31 RX ADMIN — LACTULOSE 20 G: 20 SOLUTION ORAL at 09:05

## 2021-03-31 RX ADMIN — IPRATROPIUM BROMIDE AND ALBUTEROL SULFATE 3 ML: 2.5; .5 SOLUTION RESPIRATORY (INHALATION) at 08:06

## 2021-03-31 RX ADMIN — PROPOFOL 100 MG: 10 INJECTION, EMULSION INTRAVENOUS at 10:18

## 2021-03-31 RX ADMIN — SODIUM CHLORIDE: 0.9 INJECTION, SOLUTION INTRAVENOUS at 10:15

## 2021-03-31 RX ADMIN — PANTOPRAZOLE SODIUM 40 MG: 40 INJECTION, POWDER, FOR SOLUTION INTRAVENOUS at 09:05

## 2021-03-31 RX ADMIN — LORAZEPAM 1 MG: 1 TABLET ORAL at 05:51

## 2021-03-31 RX ADMIN — FOLIC ACID 100 ML/HR: 5 INJECTION, SOLUTION INTRAMUSCULAR; INTRAVENOUS; SUBCUTANEOUS at 12:03

## 2021-03-31 RX ADMIN — SPIRONOLACTONE 100 MG: 100 TABLET ORAL at 12:30

## 2021-03-31 RX ADMIN — LIDOCAINE HYDROCHLORIDE 50 MG: 10 INJECTION, SOLUTION EPIDURAL; INFILTRATION; INTRACAUDAL; PERINEURAL at 10:18

## 2021-03-31 NOTE — ANESTHESIA PREPROCEDURE EVALUATION
Anesthesia Evaluation     Patient summary reviewed and Nursing notes reviewed   no history of anesthetic complications:  NPO Solid Status: > 8 hours  NPO Liquid Status: > 2 hours           Airway   Mallampati: II  TM distance: >3 FB  Neck ROM: full  No difficulty expected  Dental - normal exam   (+) poor dentition    Pulmonary - normal exam   (+) a smoker Current Abstained day of surgery, COPD mild, shortness of breath,   Cardiovascular - negative cardio ROS and normal exam        Neuro/Psych  (+) psychiatric history Anxiety and Depression,     GI/Hepatic/Renal/Endo    (+) obesity, morbid obesity, GI bleeding upper , hepatitis C, liver disease fatty liver disease history of elevated LFT,     ROS Comment: Alcoholic cirrhosis spitting up blood last night    Musculoskeletal     Abdominal   (+) obese,    Substance History   (+) alcohol use,      OB/GYN          Other   arthritis,                    Anesthesia Plan    ASA 4     MAC   total IV anesthesia  intravenous induction     Anesthetic plan, all risks, benefits, and alternatives have been provided, discussed and informed consent has been obtained with: patient.    Plan discussed with CRNA.

## 2021-03-31 NOTE — ANESTHESIA POSTPROCEDURE EVALUATION
Patient: Enio Mcleod    Procedure Summary     Date: 03/31/21 Room / Location: [facility] ENDOSCOPY 1 / [facility] ENDOSCOPY    Anesthesia Start: 1016 Anesthesia Stop: 1027    Procedure: ESOPHAGOGASTRODUODENOSCOPY with banding of esophageal varices x 7 (N/A ) Diagnosis:       Gastrointestinal hemorrhage, unspecified gastrointestinal hemorrhage type      (Gastrointestinal hemorrhage, unspecified gastrointestinal hemorrhage type [K92.2])    Surgeons: Ananda Karimi MD Provider: Jose R Price MD    Anesthesia Type: MAC ASA Status: 4          Anesthesia Type: MAC    Vitals  Vitals Value Taken Time   /70 03/31/21 1051   Temp     Pulse 92 03/31/21 1051   Resp 16 03/31/21 1051   SpO2 94 % 03/31/21 1051           Post Anesthesia Care and Evaluation    Patient location during evaluation: PACU  Patient participation: complete - patient participated  Level of consciousness: awake  Pain score: 0  Pain management: adequate  Airway patency: patent  Anesthetic complications: No anesthetic complications  PONV Status: none  Cardiovascular status: acceptable  Respiratory status: acceptable  Hydration status: acceptable    Comments: Patient seen and examined postoperatively; vital signs stable; SpO2 greater than or equal to 90%; cardiopulmonary status stable; nausea/vomiting adequately controlled; pain adequately controlled; no apparent anesthesia complications; patient discharged from anesthesia care when discharge criteria were met

## 2021-04-01 LAB
ACTIN IGG SERPL-ACNC: 9 UNITS (ref 0–19)
MITOCHONDRIA M2 IGG SER-ACNC: <20 UNITS (ref 0–20)

## 2021-04-02 LAB
ANA SER QL: NEGATIVE
HCV GENTYP SERPL NAA+PROBE: 3
HCV RNA SERPL NAA+PROBE-ACNC: 8160 IU/ML
HCV RNA SERPL NAA+PROBE-LOG IU: 3.91 LOG10 IU/ML
LABORATORY COMMENT REPORT: NORMAL
TEST INFORMATION: NORMAL

## 2021-04-07 LAB — QT INTERVAL: 374 MS

## 2022-01-28 ENCOUNTER — OFFICE (OUTPATIENT)
Dept: URBAN - METROPOLITAN AREA CLINIC 64 | Facility: CLINIC | Age: 62
End: 2022-01-28

## 2022-01-28 DIAGNOSIS — I85.00 ESOPHAGEAL VARICES WITHOUT BLEEDING: ICD-10-CM

## 2022-03-16 ENCOUNTER — TRANSCRIBE ORDERS (OUTPATIENT)
Dept: ADMINISTRATIVE | Facility: HOSPITAL | Age: 62
End: 2022-03-16

## 2022-03-16 DIAGNOSIS — Z02.71 ENCOUNTER FOR DISABILITY DETERMINATION: Primary | ICD-10-CM

## 2022-04-01 ENCOUNTER — TRANSCRIBE ORDERS (OUTPATIENT)
Dept: ADMINISTRATIVE | Facility: HOSPITAL | Age: 62
End: 2022-04-01

## 2022-04-01 DIAGNOSIS — Z02.71 ENCOUNTER FOR DISABILITY DETERMINATION: Primary | ICD-10-CM

## 2022-04-14 ENCOUNTER — HOSPITAL ENCOUNTER (OUTPATIENT)
Dept: RESPIRATORY THERAPY | Facility: HOSPITAL | Age: 62
Discharge: HOME | End: 2022-04-14

## 2022-04-14 DIAGNOSIS — Z02.71 ENCOUNTER FOR DISABILITY DETERMINATION: ICD-10-CM

## 2022-04-14 PROCEDURE — 94060 EVALUATION OF WHEEZING: CPT

## 2022-04-14 RX ORDER — ALBUTEROL SULFATE 90 UG/1
2 AEROSOL, METERED RESPIRATORY (INHALATION) ONCE
Status: COMPLETED | OUTPATIENT
Start: 2022-04-14 | End: 2022-04-14

## 2022-04-14 RX ADMIN — ALBUTEROL SULFATE 2 PUFF: 108 INHALANT RESPIRATORY (INHALATION) at 14:36

## 2022-06-22 ENCOUNTER — OFFICE VISIT (OUTPATIENT)
Dept: FAMILY MEDICINE CLINIC | Facility: CLINIC | Age: 62
End: 2022-06-22

## 2022-06-22 VITALS
DIASTOLIC BLOOD PRESSURE: 70 MMHG | WEIGHT: 294.6 LBS | HEIGHT: 72 IN | HEART RATE: 73 BPM | SYSTOLIC BLOOD PRESSURE: 108 MMHG | OXYGEN SATURATION: 97 % | BODY MASS INDEX: 39.9 KG/M2

## 2022-06-22 DIAGNOSIS — B18.2 CHRONIC HEPATITIS C WITHOUT HEPATIC COMA: Primary | ICD-10-CM

## 2022-06-22 DIAGNOSIS — J44.1 COPD WITH EXACERBATION: ICD-10-CM

## 2022-06-22 DIAGNOSIS — Z00.00 PREVENTATIVE HEALTH CARE: ICD-10-CM

## 2022-06-22 DIAGNOSIS — K76.6 PORTAL HYPERTENSION: ICD-10-CM

## 2022-06-22 DIAGNOSIS — I85.00 ESOPHAGEAL VARICES WITHOUT BLEEDING, UNSPECIFIED ESOPHAGEAL VARICES TYPE: ICD-10-CM

## 2022-06-22 DIAGNOSIS — R06.09 DYSPNEA ON EXERTION: ICD-10-CM

## 2022-06-22 DIAGNOSIS — F41.9 ANXIETY: ICD-10-CM

## 2022-06-22 DIAGNOSIS — R60.1 GENERALIZED EDEMA: ICD-10-CM

## 2022-06-22 DIAGNOSIS — F32.1 CURRENT MODERATE EPISODE OF MAJOR DEPRESSIVE DISORDER WITHOUT PRIOR EPISODE: ICD-10-CM

## 2022-06-22 DIAGNOSIS — Z72.0 CURRENTLY ATTEMPTING TO QUIT SMOKING: ICD-10-CM

## 2022-06-22 PROCEDURE — 80050 GENERAL HEALTH PANEL: CPT | Performed by: NURSE PRACTITIONER

## 2022-06-22 PROCEDURE — 80061 LIPID PANEL: CPT | Performed by: NURSE PRACTITIONER

## 2022-06-22 PROCEDURE — 3008F BODY MASS INDEX DOCD: CPT | Performed by: NURSE PRACTITIONER

## 2022-06-22 PROCEDURE — 99386 PREV VISIT NEW AGE 40-64: CPT | Performed by: NURSE PRACTITIONER

## 2022-06-22 PROCEDURE — 83880 ASSAY OF NATRIURETIC PEPTIDE: CPT | Performed by: NURSE PRACTITIONER

## 2022-06-22 PROCEDURE — 36415 COLL VENOUS BLD VENIPUNCTURE: CPT | Performed by: NURSE PRACTITIONER

## 2022-06-22 PROCEDURE — 2014F MENTAL STATUS ASSESS: CPT | Performed by: NURSE PRACTITIONER

## 2022-06-22 RX ORDER — HYDROXYZINE HYDROCHLORIDE 10 MG/1
10 TABLET, FILM COATED ORAL EVERY 8 HOURS PRN
Qty: 30 TABLET | Refills: 0 | Status: SHIPPED | OUTPATIENT
Start: 2022-06-22 | End: 2022-09-21

## 2022-06-22 RX ORDER — ALBUTEROL SULFATE 90 UG/1
2 AEROSOL, METERED RESPIRATORY (INHALATION) EVERY 4 HOURS PRN
Qty: 18 G | Refills: 5 | Status: SHIPPED | OUTPATIENT
Start: 2022-06-22 | End: 2023-03-14 | Stop reason: SDUPTHER

## 2022-06-22 RX ORDER — LACTULOSE 10 G/15ML
20 SOLUTION ORAL DAILY
Qty: 946 ML | Refills: 2 | Status: SHIPPED | OUTPATIENT
Start: 2022-06-22 | End: 2022-08-22 | Stop reason: SDUPTHER

## 2022-06-22 RX ORDER — SPIRONOLACTONE 100 MG/1
100 TABLET, FILM COATED ORAL DAILY
Qty: 30 TABLET | Refills: 5 | Status: SHIPPED | OUTPATIENT
Start: 2022-06-22 | End: 2022-08-22

## 2022-06-22 RX ORDER — BUDESONIDE, GLYCOPYRROLATE, AND FORMOTEROL FUMARATE 160; 9; 4.8 UG/1; UG/1; UG/1
2 AEROSOL, METERED RESPIRATORY (INHALATION) 2 TIMES DAILY
Qty: 10.7 G | Refills: 5 | Status: SHIPPED | OUTPATIENT
Start: 2022-06-22 | End: 2022-08-22 | Stop reason: CLARIF

## 2022-06-22 RX ORDER — PROPRANOLOL HYDROCHLORIDE 20 MG/1
20 TABLET ORAL 2 TIMES DAILY
Qty: 60 TABLET | Refills: 5 | Status: SHIPPED | OUTPATIENT
Start: 2022-06-22 | End: 2022-10-24 | Stop reason: SDUPTHER

## 2022-06-22 NOTE — PROGRESS NOTES
Chief Complaint  Chief Complaint   Patient presents with   • Annual Exam   • Establish Care   • Hepatitis C     Pt reports he has had it for last 25 yrs, pt hasn't established with GI   • Nicotine Dependence     Pt reports that he quit last Saturday but is having breathing issues.   • Med Refill     Needs all refills, but worried about taking bp med because his bp is normal at home but high at dr's office so wondering if he really needs.           Subjective          Enio Mcleod presents to [facility] for   History of Present Illness     New 61-year-old male patient presents to establish care with new provider and for annual exam.  He has a past medical history of hepatitis C, cirrhosis.  He quit smoking last week.     Hepatitis C, cirrhosis of the liver, secondary to alcohol abuse.  Patient was admitted to the hospital on 3/30/2021 due to vomiting blood and black stool.  He has never received treatment for hepatitis C.  Dr. Karimi performed EGD and found esophageal varices with portal hypertension.  He was started on beta-blockers for portal hypertension.  Instructed to stop smoking and alcohol and discharged on Librium for 5 days.  He has not followed up with GI. Reports constipation, confusion, weakness, fatigue. Previous ammonia >130  US Liver (03/31/2021 09:29)    He reports getting soa walking to mailbox, stops and has to sit to rest, reports swelling of the legs, ran out of spironolactone.     Anxious/depressed, all medical conditions are overwhelming, see phq9.       PHQ-9 Depression Screening  Little interest or pleasure in doing things? 2-->more than half the days   Feeling down, depressed, or hopeless? 3-->nearly every day   Trouble falling or staying asleep, or sleeping too much? 3-->nearly every day   Feeling tired or having little energy? 3-->nearly every day   Poor appetite or overeating? 3-->nearly every day   Feeling bad about yourself - or that you are a failure  or have let yourself or your family down? 2-->more than half the days   Trouble concentrating on things, such as reading the newspaper or watching television? 3-->nearly every day   Moving or speaking so slowly that other people could have noticed? Or the opposite - being so fidgety or restless that you have been moving around a lot more than usual? 3-->nearly every day   Thoughts that you would be better off dead, or of hurting yourself in some way? 2-->more than half the days   PHQ-9 Total Score 24   If you checked off any problems, how difficult have these problems made it for you to do your work, take care of things at home, or get along with other people? very difficult           The following portions of the patient's history were reviewed and updated as appropriate: allergies, current medications, past family history, past medical history, past social history, past surgical history and problem list.    Past Medical History:   Diagnosis Date   • Cirrhosis of liver (HCC)    • Hepatitis C    • Obesity    • Substance abuse (HCC)      Past Surgical History:   Procedure Laterality Date   • ENDOSCOPY N/A 3/31/2021    Procedure: ESOPHAGOGASTRODUODENOSCOPY with banding of esophageal varices x 7;  Surgeon: Ananda Karimi MD;  Location: [facility] ENDOSCOPY;  Service: Gastroenterology;  Laterality: N/A;  post op: grade 3 esophgeal varices     Family History   Problem Relation Age of Onset   • Diabetes Mother    • Depression Mother    • Diabetes Father    • Depression Father      Social History     Tobacco Use   • Smoking status: Former Smoker     Packs/day: 2.50     Years: 40.00     Pack years: 100.00     Types: Cigarettes     Start date:      Quit date:      Years since quittin.4   • Smokeless tobacco: Never Used   Substance Use Topics   • Alcohol use: Yes     Alcohol/week: 28.0 standard drinks     Types: 28 Cans of beer per week       Current Outpatient Medications:   •  albuterol sulfate  (90 Base)  "MCG/ACT inhaler, Inhale 2 puffs Every 4 (Four) Hours As Needed for Wheezing or Shortness of Air., Disp: 18 g, Rfl: 5  •  propranolol (INDERAL) 20 MG tablet, Take 1 tablet by mouth 2 (Two) Times a Day., Disp: 60 tablet, Rfl: 5  •  spironolactone (ALDACTONE) 100 MG tablet, Take 1 tablet by mouth Daily for 30 days., Disp: 30 tablet, Rfl: 5  •  Budeson-Glycopyrrol-Formoterol (Breztri Aerosphere) 160-9-4.8 MCG/ACT aerosol inhaler, Inhale 2 puffs 2 (Two) Times a Day., Disp: 10.7 g, Rfl: 5  •  hydrOXYzine (ATARAX) 10 MG tablet, Take 1 tablet by mouth Every 8 (Eight) Hours As Needed for Anxiety., Disp: 30 tablet, Rfl: 0  •  lactulose (CHRONULAC) 10 GM/15ML solution, Take 30 mL by mouth Daily., Disp: 946 mL, Rfl: 2    Objective   Vital Signs:   /70 (BP Location: Left arm, Patient Position: Sitting, Cuff Size: Large Adult)   Pulse 73   Ht 182.9 cm (72.01\")   Wt 134 kg (294 lb 9.6 oz)   SpO2 97%   BMI 39.95 kg/m²           Physical Exam  Vitals and nursing note reviewed.   Constitutional:       General: He is not in acute distress.     Appearance: He is well-developed. He is ill-appearing. He is not diaphoretic.   HENT:      Head: Normocephalic and atraumatic.   Eyes:      Pupils: Pupils are equal, round, and reactive to light.   Neck:      Thyroid: No thyromegaly.   Cardiovascular:      Rate and Rhythm: Normal rate and regular rhythm.      Heart sounds: Murmur heard.   Pulmonary:      Effort: Pulmonary effort is normal. No respiratory distress.      Breath sounds: Normal breath sounds.   Abdominal:      General: Bowel sounds are normal. There is distension.      Palpations: Abdomen is soft.      Tenderness: There is no abdominal tenderness.   Musculoskeletal:         General: Swelling present. Normal range of motion.      Cervical back: Normal range of motion.   Skin:     General: Skin is warm and dry.      Coloration: Skin is pale. Skin is not jaundiced.      Findings: No erythema.   Neurological:      Mental " Status: He is alert and oriented to person, place, and time.      Motor: Weakness present.      Gait: Gait abnormal.   Psychiatric:         Behavior: Behavior normal.         Thought Content: Thought content normal.         Judgment: Judgment normal.          Result Review :     No visits with results within 7 Day(s) from this visit.   Latest known visit with results is:   Admission on 03/30/2021, Discharged on 03/31/2021   Component Date Value Ref Range Status   • Glucose 03/30/2021 168 (A) 65 - 99 mg/dL Final   • BUN 03/30/2021 31 (A) 8 - 23 mg/dL Final   • Creatinine 03/30/2021 0.59 (A) 0.76 - 1.27 mg/dL Final   • Sodium 03/30/2021 140  136 - 145 mmol/L Final   • Potassium 03/30/2021 4.3  3.5 - 5.2 mmol/L Final   • Chloride 03/30/2021 106  98 - 107 mmol/L Final   • CO2 03/30/2021 23.0  22.0 - 29.0 mmol/L Final   • Calcium 03/30/2021 8.6  8.6 - 10.5 mg/dL Final   • Total Protein 03/30/2021 6.6  6.0 - 8.5 g/dL Final   • Albumin 03/30/2021 3.20 (A) 3.50 - 5.20 g/dL Final   • ALT (SGPT) 03/30/2021 56 (A) 1 - 41 U/L Final   • AST (SGOT) 03/30/2021 74 (A) 1 - 40 U/L Final   • Alkaline Phosphatase 03/30/2021 73  39 - 117 U/L Final   • Total Bilirubin 03/30/2021 3.5 (A) 0.0 - 1.2 mg/dL Final   • eGFR Non African Amer 03/30/2021 140  >60 mL/min/1.73 Final   • eGFR   Amer 03/30/2021 >150  >60 mL/min/1.73 Final   • Globulin 03/30/2021 3.4  gm/dL Final   • A/G Ratio 03/30/2021 0.9  g/dL Final   • BUN/Creatinine Ratio 03/30/2021 52.5 (A) 7.0 - 25.0 Final   • Anion Gap 03/30/2021 11.0  5.0 - 15.0 mmol/L Final   • Protime 03/30/2021 13.2 (A) 9.6 - 11.7 Seconds Final   • INR 03/30/2021 1.21 (A) 0.93 - 1.10 Final   • PTT 03/30/2021 23.1 (A) 24.0 - 31.0 seconds Final   • Lipase 03/30/2021 18  13 - 60 U/L Final   • ABO Type 03/30/2021 A   Final   • RH type 03/30/2021 Positive   Final   • Antibody Screen 03/30/2021 Negative   Final   • T&S Expiration Date 03/30/2021 4/2/2021 11:59:59 PM   Final   • Magnesium 03/30/2021 1.6   1.6 - 2.4 mg/dL Final   • Ethanol % 03/30/2021 <0.010  % Final   • WBC 03/30/2021 11.10 (A) 3.40 - 10.80 10*3/mm3 Final   • RBC 03/30/2021 3.13 (A) 4.14 - 5.80 10*6/mm3 Final   • Hemoglobin 03/30/2021 11.0 (A) 13.0 - 17.7 g/dL Final   • Hematocrit 03/30/2021 31.4 (A) 37.5 - 51.0 % Final   • MCV 03/30/2021 100.2 (A) 79.0 - 97.0 fL Final   • MCH 03/30/2021 35.2 (A) 26.6 - 33.0 pg Final   • MCHC 03/30/2021 35.1  31.5 - 35.7 g/dL Final   • RDW 03/30/2021 14.4  12.3 - 15.4 % Final   • RDW-SD 03/30/2021 51.2  37.0 - 54.0 fl Final   • MPV 03/30/2021 10.1  6.0 - 12.0 fL Final   • Platelets 03/30/2021 111 (A) 140 - 450 10*3/mm3 Final   • Neutrophil % 03/30/2021 82.5 (A) 42.7 - 76.0 % Final   • Lymphocyte % 03/30/2021 9.2 (A) 19.6 - 45.3 % Final   • Monocyte % 03/30/2021 8.2  5.0 - 12.0 % Final   • Eosinophil % 03/30/2021 0.0 (A) 0.3 - 6.2 % Final   • Basophil % 03/30/2021 0.1  0.0 - 1.5 % Final   • Neutrophils, Absolute 03/30/2021 9.10 (A) 1.70 - 7.00 10*3/mm3 Final   • Lymphocytes, Absolute 03/30/2021 1.00  0.70 - 3.10 10*3/mm3 Final   • Monocytes, Absolute 03/30/2021 0.90  0.10 - 0.90 10*3/mm3 Final   • Eosinophils, Absolute 03/30/2021 0.00  0.00 - 0.40 10*3/mm3 Final   • Basophils, Absolute 03/30/2021 0.00  0.00 - 0.20 10*3/mm3 Final   • nRBC 03/30/2021 0.1  0.0 - 0.2 /100 WBC Final   • Hemoglobin 03/31/2021 10.0 (A) 13.0 - 17.7 g/dL Final   • Lactate 03/31/2021 1.7  0.5 - 2.0 mmol/L Final   • Magnesium 03/31/2021 1.9  1.6 - 2.4 mg/dL Final   • Lactate 03/31/2021 2.4 (A) 0.5 - 2.0 mmol/L Final   • Protime 03/31/2021 13.2 (A) 9.6 - 11.7 Seconds Final   • INR 03/31/2021 1.21 (A) 0.93 - 1.10 Final   • Amphet/Methamphet, Screen 03/31/2021 Negative  Negative Final   • Barbiturates Screen, Urine 03/31/2021 Negative  Negative Final   • Benzodiazepine Screen, Urine 03/31/2021 Negative  Negative Final   • Cocaine Screen, Urine 03/31/2021 Negative  Negative Final   • Opiate Screen 03/31/2021 Negative  Negative Final   • THC,  Screen, Urine 03/31/2021 Positive (A) Negative Final   • Methadone Screen, Urine 03/31/2021 Negative  Negative Final   • Oxycodone Screen, Urine 03/31/2021 Negative  Negative Final   • Hemoglobin A1C 03/31/2021 4.7  3.5 - 5.6 % Final   • Glucose 03/31/2021 152 (A) 65 - 99 mg/dL Final   • BUN 03/31/2021 34 (A) 8 - 23 mg/dL Final   • Creatinine 03/31/2021 0.74 (A) 0.76 - 1.27 mg/dL Final   • Sodium 03/31/2021 142  136 - 145 mmol/L Final   • Potassium 03/31/2021 4.2  3.5 - 5.2 mmol/L Final   • Chloride 03/31/2021 108 (A) 98 - 107 mmol/L Final   • CO2 03/31/2021 26.0  22.0 - 29.0 mmol/L Final   • Calcium 03/31/2021 7.9 (A) 8.6 - 10.5 mg/dL Final   • Total Protein 03/31/2021 6.0  6.0 - 8.5 g/dL Final   • Albumin 03/31/2021 2.80 (A) 3.50 - 5.20 g/dL Final   • ALT (SGPT) 03/31/2021 50 (A) 1 - 41 U/L Final   • AST (SGOT) 03/31/2021 66 (A) 1 - 40 U/L Final   • Alkaline Phosphatase 03/31/2021 67  39 - 117 U/L Final   • Total Bilirubin 03/31/2021 2.5 (A) 0.0 - 1.2 mg/dL Final   • eGFR Non African Amer 03/31/2021 108  >60 mL/min/1.73 Final   • Globulin 03/31/2021 3.2  gm/dL Final   • A/G Ratio 03/31/2021 0.9  g/dL Final   • BUN/Creatinine Ratio 03/31/2021 45.9 (A) 7.0 - 25.0 Final   • Anion Gap 03/31/2021 8.0  5.0 - 15.0 mmol/L Final   • Color, UA 03/31/2021 Orange (A) Yellow, Straw Final    Result checked    • Appearance, UA 03/31/2021 Clear  Clear Final   • pH, UA 03/31/2021 6.5  5.0 - 8.0 Final   • Specific Gravity, UA 03/31/2021 1.028  1.005 - 1.030 Final   • Glucose, UA 03/31/2021 Negative  Negative Final   • Ketones, UA 03/31/2021 Trace (A) Negative Final   • Bilirubin, UA 03/31/2021 Small (1+) (A) Negative Final    Confirmation testing is unavailable.  A serum bilirubin is recommended for further assessment.   • Blood, UA 03/31/2021 Negative  Negative Final   • Protein, UA 03/31/2021 Negative  Negative Final   • Leuk Esterase, UA 03/31/2021 Trace (A) Negative Final   • Nitrite, UA 03/31/2021 Negative  Negative Final   •  Urobilinogen, UA 03/31/2021 1.0 E.U./dL  0.2 - 1.0 E.U./dL Final   • WBC 03/31/2021 9.00  3.40 - 10.80 10*3/mm3 Final   • RBC 03/31/2021 2.81 (A) 4.14 - 5.80 10*6/mm3 Final   • Hemoglobin 03/31/2021 10.2 (A) 13.0 - 17.7 g/dL Final   • Hematocrit 03/31/2021 28.6 (A) 37.5 - 51.0 % Final   • MCV 03/31/2021 101.9 (A) 79.0 - 97.0 fL Final   • MCH 03/31/2021 36.2 (A) 26.6 - 33.0 pg Final   • MCHC 03/31/2021 35.5  31.5 - 35.7 g/dL Final   • RDW 03/31/2021 14.5  12.3 - 15.4 % Final   • RDW-SD 03/31/2021 51.6  37.0 - 54.0 fl Final   • MPV 03/31/2021 9.8  6.0 - 12.0 fL Final   • Platelets 03/31/2021 104 (A) 140 - 450 10*3/mm3 Final   • Neutrophil % 03/31/2021 73.3  42.7 - 76.0 % Final   • Lymphocyte % 03/31/2021 14.9 (A) 19.6 - 45.3 % Final   • Monocyte % 03/31/2021 10.8  5.0 - 12.0 % Final   • Eosinophil % 03/31/2021 0.8  0.3 - 6.2 % Final   • Basophil % 03/31/2021 0.2  0.0 - 1.5 % Final   • Neutrophils, Absolute 03/31/2021 6.60  1.70 - 7.00 10*3/mm3 Final   • Lymphocytes, Absolute 03/31/2021 1.30  0.70 - 3.10 10*3/mm3 Final   • Monocytes, Absolute 03/31/2021 1.00 (A) 0.10 - 0.90 10*3/mm3 Final   • Eosinophils, Absolute 03/31/2021 0.10  0.00 - 0.40 10*3/mm3 Final   • Basophils, Absolute 03/31/2021 0.00  0.00 - 0.20 10*3/mm3 Final   • nRBC 03/31/2021 0.0  0.0 - 0.2 /100 WBC Final   • Troponin T 03/31/2021 <0.010  0.000 - 0.030 ng/mL Final   • Troponin T 03/31/2021 <0.010  0.000 - 0.030 ng/mL Final   • QT Interval 03/31/2021 374  ms Final   • Ammonia 03/31/2021 133 (A) 16 - 60 umol/L Final   • Troponin T 03/31/2021 <0.010  0.000 - 0.030 ng/mL Final   • RBC, UA 03/31/2021 0-2 (A) None Seen /HPF Final   • WBC, UA 03/31/2021 0-2 (A) None Seen /HPF Final   • Bacteria, UA 03/31/2021 None Seen  None Seen /HPF Final   • Squamous Epithelial Cells, UA 03/31/2021 None Seen  None Seen, 0-2 /HPF Final   • Hyaline Casts, UA 03/31/2021 None Seen  None Seen /LPF Final   • Methodology 03/31/2021 Automated Microscopy   Final   • Lactate  03/31/2021 1.5  0.5 - 2.0 mmol/L Final   • COVID19 03/30/2021 Not Detected  Not Detected - Ref. Range Final   • Hepatitis C Quantitation 03/31/2021 8160  IU/mL Final   • HCV log10 03/31/2021 3.912  log10 IU/mL Final   • Test Information 03/31/2021 Comment   Final    The quantitative range of this assay is 15 IU/mL to 100 million IU/mL.   • Please note 03/31/2021 Comment   Final    This test was developed and its performance characteristics determined  by [facility].  It has not been cleared or approved by the U.[place]. Food and  Drug Administration.  The FDA has determined that such clearance or approval is not  necessary. This test is used for clinical purposes.  It should not be  regarded as investigational or for research.   • Hepatitis C Genotype 03/31/2021 3   Final   • ALPHA-FETOPROTEIN 03/30/2021 4.96  0 - 8.3 ng/mL Final   • ALPHA -1 ANTITRYPSIN 03/31/2021 137  90 - 200 mg/dL Final   • Smooth Muscle Ab 03/31/2021 9  0 - 19 Units Final                     Negative                     0 - 19                   Weak positive               20 - 30                   Moderate to strong positive     >30   Actin Antibodies are found in 52-85% of patients with   autoimmune hepatitis or chronic active hepatitis and   in 22% of patients with primary biliary cirrhosis.   • Ferritin 03/31/2021 660.60 (A) 30.00 - 400.00 ng/mL Final   • Ceruloplasmin 03/31/2021 17  16 - 31 mg/dL Final   • GGT 03/31/2021 90 (A) 8 - 61 U/L Final   • Hepatitis B Surface Ag 03/30/2021 Non-Reactive  Non-Reactive Final   • Hep A IgM 03/30/2021 Non-Reactive  Non-Reactive Final   • Hep B C IgM 03/30/2021 Non-Reactive  Non-Reactive Final   • Hepatitis C Ab 03/30/2021 Reactive (A) Non-Reactive Final   • Mitochondrial Ab 03/31/2021 <20.0  0.0 - 20.0 Units Final                                    Negative    0.0 - 20.0                                  Equivocal  20.1 - 24.9                                  Positive         >24.9  Mitochondrial (M2) Antibodies  are found in 90-96% of  patients with primary biliary cirrhosis.   • Hemoglobin 03/31/2021 9.4 (A) 13.0 - 17.7 g/dL Final   • Hemoglobin 03/31/2021 10.1 (A) 13.0 - 17.7 g/dL Final   • Hematocrit 03/31/2021 28.8 (A) 37.5 - 51.0 % Final   • Lactate 03/31/2021 1.8  0.5 - 2.0 mmol/L Final   • ADDISON Direct 03/30/2021 Negative  Negative Final                              Assessment and Plan    Diagnoses and all orders for this visit:    1. Chronic hepatitis C without hepatic coma (HCC) (Primary)  -     Ambulatory Referral to Gastroenterology  -     Comprehensive Metabolic Panel  -     lactulose (CHRONULAC) 10 GM/15ML solution; Take 30 mL by mouth Daily.  Dispense: 946 mL; Refill: 2    2. Esophageal varices without bleeding, unspecified esophageal varices type (HCC)  -     propranolol (INDERAL) 20 MG tablet; Take 1 tablet by mouth 2 (Two) Times a Day.  Dispense: 60 tablet; Refill: 5  -     Ambulatory Referral to Gastroenterology    3. Currently attempting to quit smoking    4. Portal hypertension (HCC)  -     BNP    5. Preventative health care  -     Lipid Panel  -     Comprehensive Metabolic Panel  -     CBC (No Diff)  -     TSH  -     BNP    6. Generalized edema  -     spironolactone (ALDACTONE) 100 MG tablet; Take 1 tablet by mouth Daily for 30 days.  Dispense: 30 tablet; Refill: 5  -     BNP    7. Dyspnea on exertion  -     BNP    8. Anxiety  -     hydrOXYzine (ATARAX) 10 MG tablet; Take 1 tablet by mouth Every 8 (Eight) Hours As Needed for Anxiety.  Dispense: 30 tablet; Refill: 0    9. Current moderate episode of major depressive disorder without prior episode (HCC)    10. COPD with exacerbation (HCC)  -     Budeson-Glycopyrrol-Formoterol (Breztri Aerosphere) 160-9-4.8 MCG/ACT aerosol inhaler; Inhale 2 puffs 2 (Two) Times a Day.  Dispense: 10.7 g; Refill: 5  -     albuterol sulfate  (90 Base) MCG/ACT inhaler; Inhale 2 puffs Every 4 (Four) Hours As Needed for Wheezing or Shortness of Air.  Dispense: 18 g; Refill:  5    1. Praised efforts of smoking cessation  2. Has had a few beers since march, not regularly. Recommend stop alcohol completely  3. rec trial of hydroxyzine prn for anxiety, no ssri d/t liver function  4. rec trial of breztri, sample provided.   5. Check labs as ordered with BNP, consider echo  6. Referral to gsi  7. Start lactulose once daily, need ammonia level but unable to collect at our lab today.   8. Continue propranolol for esoph varices and portal hypertension.   9. Pt to call for any questions/problems, concerns.     I spent 45 minutes caring for Enio Mcleod on this date of service. This time includes time spent by me in the following activities: preparing for the visit, reviewing tests, performing a medically appropriate examination and/or evaluation , counseling and educating the patient/family/caregiver, ordering medications, tests, or procedures and documenting information in the medical record        Follow Up     Return in about 6 weeks (around 8/3/2022) for Recheck hep c, esophageal varices, copd.  Patient was given instructions and counseling regarding his condition or for health maintenance advice. Please see specific information pulled into the AVS if appropriate.        Part of this note may be an electronic transcription/translation of spoken language to printed text using the Dragon Dictation System

## 2022-06-22 NOTE — PROGRESS NOTES
Venipuncture Blood Specimen Collection  Venipuncture performed in rt arm by Uma Steele MA with good hemostasis. Patient tolerated the procedure well without complications.   06/22/22   Uma Steele MA

## 2022-06-23 LAB
ALBUMIN SERPL-MCNC: 3.6 G/DL (ref 3.5–5.2)
ALBUMIN/GLOB SERPL: 1.1 G/DL
ALP SERPL-CCNC: 147 U/L (ref 39–117)
ALT SERPL W P-5'-P-CCNC: 48 U/L (ref 1–41)
ANION GAP SERPL CALCULATED.3IONS-SCNC: 7.6 MMOL/L (ref 5–15)
AST SERPL-CCNC: 72 U/L (ref 1–40)
BILIRUB SERPL-MCNC: 1.4 MG/DL (ref 0–1.2)
BUN SERPL-MCNC: 15 MG/DL (ref 8–23)
BUN/CREAT SERPL: 23.4 (ref 7–25)
CALCIUM SPEC-SCNC: 9.4 MG/DL (ref 8.6–10.5)
CHLORIDE SERPL-SCNC: 107 MMOL/L (ref 98–107)
CHOLEST SERPL-MCNC: 137 MG/DL (ref 0–200)
CO2 SERPL-SCNC: 25.4 MMOL/L (ref 22–29)
CREAT SERPL-MCNC: 0.64 MG/DL (ref 0.76–1.27)
DEPRECATED RDW RBC AUTO: 42.6 FL (ref 37–54)
EGFRCR SERPLBLD CKD-EPI 2021: 107 ML/MIN/1.73
ERYTHROCYTE [DISTWIDTH] IN BLOOD BY AUTOMATED COUNT: 12.2 % (ref 12.3–15.4)
GLOBULIN UR ELPH-MCNC: 3.3 GM/DL
GLUCOSE SERPL-MCNC: 108 MG/DL (ref 65–99)
HCT VFR BLD AUTO: 38.1 % (ref 37.5–51)
HDLC SERPL-MCNC: 73 MG/DL (ref 40–60)
HGB BLD-MCNC: 13.6 G/DL (ref 13–17.7)
LDLC SERPL CALC-MCNC: 50 MG/DL (ref 0–100)
LDLC/HDLC SERPL: 0.69 {RATIO}
MCH RBC QN AUTO: 34.6 PG (ref 26.6–33)
MCHC RBC AUTO-ENTMCNC: 35.7 G/DL (ref 31.5–35.7)
MCV RBC AUTO: 96.9 FL (ref 79–97)
NT-PROBNP SERPL-MCNC: 23.5 PG/ML (ref 0–900)
PLATELET # BLD AUTO: 92 10*3/MM3 (ref 140–450)
POTASSIUM SERPL-SCNC: 4 MMOL/L (ref 3.5–5.2)
PROT SERPL-MCNC: 6.9 G/DL (ref 6–8.5)
RBC # BLD AUTO: 3.93 10*6/MM3 (ref 4.14–5.8)
SODIUM SERPL-SCNC: 140 MMOL/L (ref 136–145)
TRIGL SERPL-MCNC: 69 MG/DL (ref 0–150)
TSH SERPL DL<=0.05 MIU/L-ACNC: 2.35 UIU/ML (ref 0.27–4.2)
VLDLC SERPL-MCNC: 14 MG/DL (ref 5–40)
WBC NRBC COR # BLD: 4.44 10*3/MM3 (ref 3.4–10.8)

## 2022-08-22 ENCOUNTER — OFFICE VISIT (OUTPATIENT)
Dept: FAMILY MEDICINE CLINIC | Facility: CLINIC | Age: 62
End: 2022-08-22

## 2022-08-22 VITALS
SYSTOLIC BLOOD PRESSURE: 122 MMHG | BODY MASS INDEX: 39.74 KG/M2 | DIASTOLIC BLOOD PRESSURE: 62 MMHG | HEART RATE: 52 BPM | WEIGHT: 293.4 LBS | HEIGHT: 72 IN | OXYGEN SATURATION: 96 %

## 2022-08-22 DIAGNOSIS — F32.1 CURRENT MODERATE EPISODE OF MAJOR DEPRESSIVE DISORDER WITHOUT PRIOR EPISODE: ICD-10-CM

## 2022-08-22 DIAGNOSIS — K21.00 GASTROESOPHAGEAL REFLUX DISEASE WITH ESOPHAGITIS WITHOUT HEMORRHAGE: ICD-10-CM

## 2022-08-22 DIAGNOSIS — G47.09 OTHER INSOMNIA: ICD-10-CM

## 2022-08-22 DIAGNOSIS — I85.00 ESOPHAGEAL VARICES WITHOUT BLEEDING, UNSPECIFIED ESOPHAGEAL VARICES TYPE: ICD-10-CM

## 2022-08-22 DIAGNOSIS — B18.2 CHRONIC HEPATITIS C WITHOUT HEPATIC COMA: ICD-10-CM

## 2022-08-22 DIAGNOSIS — F17.210 CIGARETTE NICOTINE DEPENDENCE WITHOUT COMPLICATION: ICD-10-CM

## 2022-08-22 DIAGNOSIS — J44.9 CHRONIC OBSTRUCTIVE PULMONARY DISEASE, UNSPECIFIED COPD TYPE: ICD-10-CM

## 2022-08-22 DIAGNOSIS — K76.6 PORTAL HYPERTENSION: Primary | ICD-10-CM

## 2022-08-22 DIAGNOSIS — Z72.0 CURRENTLY ATTEMPTING TO QUIT SMOKING: ICD-10-CM

## 2022-08-22 PROCEDURE — 99214 OFFICE O/P EST MOD 30 MIN: CPT | Performed by: NURSE PRACTITIONER

## 2022-08-22 RX ORDER — OMEPRAZOLE 40 MG/1
40 CAPSULE, DELAYED RELEASE ORAL DAILY
Qty: 30 CAPSULE | Refills: 5 | Status: SHIPPED | OUTPATIENT
Start: 2022-08-22

## 2022-08-22 RX ORDER — BUDESONIDE AND FORMOTEROL FUMARATE DIHYDRATE 160; 4.5 UG/1; UG/1
2 AEROSOL RESPIRATORY (INHALATION)
Qty: 10.2 G | Refills: 11 | Status: SHIPPED | OUTPATIENT
Start: 2022-08-22

## 2022-08-22 RX ORDER — AMITRIPTYLINE HYDROCHLORIDE 10 MG/1
10 TABLET, FILM COATED ORAL NIGHTLY
Qty: 30 TABLET | Refills: 2 | Status: SHIPPED | OUTPATIENT
Start: 2022-08-22

## 2022-08-22 RX ORDER — LACTULOSE 10 G/15ML
10 SOLUTION ORAL DAILY
Qty: 450 ML | Refills: 5 | Status: SHIPPED | OUTPATIENT
Start: 2022-08-22

## 2022-08-22 NOTE — PROGRESS NOTES
Chief Complaint  Chief Complaint   Patient presents with   • Follow-up     6 wk f/u            Subjective          Enio Mcleod presents to [facility] for   History of Present Illness       Chronic hepatitis C, esophageal varices, portal hypertension.  Patient was referred to gastroenterology, continued on propanolol and started lactulose due to elevated ammonia during hospitalization. He c/o diarrhea and heartburn.  He has an appointment Friday with gastro.     COPD, patient was prescribed Breztri and albuterol inhaler, insurance denied Breztri.  He is smoking 1 to 3 cigarettes/day    Anxiety/depression, hydrox minimally effective and causes drowsiness, no SSRI due to liver function. He also complains of chronic insomnia      The following portions of the patient's history were reviewed and updated as appropriate: allergies, current medications, past family history, past medical history, past social history, past surgical history and problem list.    Past Medical History:   Diagnosis Date   • Cirrhosis of liver (HCC)    • Hepatitis C    • Obesity    • Substance abuse (HCC)      Past Surgical History:   Procedure Laterality Date   • ENDOSCOPY N/A 3/31/2021    Procedure: ESOPHAGOGASTRODUODENOSCOPY with banding of esophageal varices x 7;  Surgeon: Ananda Karimi MD;  Location: [facility] ENDOSCOPY;  Service: Gastroenterology;  Laterality: N/A;  post op: grade 3 esophgeal varices     Family History   Problem Relation Age of Onset   • Diabetes Mother    • Depression Mother    • Diabetes Father    • Depression Father      Social History     Tobacco Use   • Smoking status: Former Smoker     Packs/day: 2.50     Years: 40.00     Pack years: 100.00     Types: Cigarettes     Start date:      Quit date:      Years since quittin.6   • Smokeless tobacco: Never Used   Substance Use Topics   • Alcohol use: Yes     Alcohol/week: 28.0 standard drinks     Types: 28 Cans of beer per week  "      Current Outpatient Medications:   •  albuterol sulfate  (90 Base) MCG/ACT inhaler, Inhale 2 puffs Every 4 (Four) Hours As Needed for Wheezing or Shortness of Air., Disp: 18 g, Rfl: 5  •  hydrOXYzine (ATARAX) 10 MG tablet, Take 1 tablet by mouth Every 8 (Eight) Hours As Needed for Anxiety., Disp: 30 tablet, Rfl: 0  •  lactulose (CHRONULAC) 10 GM/15ML solution, Take 15 mL by mouth Daily., Disp: 450 mL, Rfl: 5  •  propranolol (INDERAL) 20 MG tablet, Take 1 tablet by mouth 2 (Two) Times a Day., Disp: 60 tablet, Rfl: 5  •  amitriptyline (ELAVIL) 10 MG tablet, Take 1 tablet by mouth Every Night. For depression/insomnia, Disp: 30 tablet, Rfl: 2  •  budesonide-formoterol (Symbicort) 160-4.5 MCG/ACT inhaler, Inhale 2 puffs 2 (Two) Times a Day., Disp: 10.2 g, Rfl: 11  •  omeprazole (priLOSEC) 40 MG capsule, Take 1 capsule by mouth Daily., Disp: 30 capsule, Rfl: 5    Objective   Vital Signs:   /62 (BP Location: Left arm, Patient Position: Sitting, Cuff Size: Large Adult)   Pulse 52   Ht 182.9 cm (72\")   Wt 133 kg (293 lb 6.4 oz)   SpO2 96%   BMI 39.79 kg/m²           Physical Exam  Vitals and nursing note reviewed.   Constitutional:       General: He is not in acute distress.     Appearance: He is well-developed. He is obese. He is not diaphoretic.   HENT:      Head: Normocephalic and atraumatic.   Eyes:      Pupils: Pupils are equal, round, and reactive to light.   Neck:      Thyroid: No thyromegaly.   Cardiovascular:      Rate and Rhythm: Normal rate and regular rhythm.      Heart sounds: Normal heart sounds. No murmur heard.  Pulmonary:      Effort: Pulmonary effort is normal. No respiratory distress.      Breath sounds: Wheezing present. No rhonchi.   Abdominal:      General: Bowel sounds are normal. There is no distension.      Palpations: Abdomen is soft.      Tenderness: There is no abdominal tenderness.   Musculoskeletal:         General: Swelling (trace pitting BLE) present. Normal range of " motion.      Cervical back: Normal range of motion.   Skin:     General: Skin is warm and dry.      Coloration: Skin is pale.      Findings: No erythema.   Neurological:      General: No focal deficit present.      Mental Status: He is alert and oriented to person, place, and time. Mental status is at baseline.   Psychiatric:         Behavior: Behavior normal.         Thought Content: Thought content normal.         Judgment: Judgment normal.          Result Review :     No visits with results within 7 Day(s) from this visit.   Latest known visit with results is:   Office Visit on 06/22/2022   Component Date Value Ref Range Status   • Total Cholesterol 06/22/2022 137  0 - 200 mg/dL Final   • Triglycerides 06/22/2022 69  0 - 150 mg/dL Final   • HDL Cholesterol 06/22/2022 73 (A) 40 - 60 mg/dL Final   • LDL Cholesterol  06/22/2022 50  0 - 100 mg/dL Final   • VLDL Cholesterol 06/22/2022 14  5 - 40 mg/dL Final   • LDL/HDL Ratio 06/22/2022 0.69   Final   • Glucose 06/22/2022 108 (A) 65 - 99 mg/dL Final   • BUN 06/22/2022 15  8 - 23 mg/dL Final   • Creatinine 06/22/2022 0.64 (A) 0.76 - 1.27 mg/dL Final   • Sodium 06/22/2022 140  136 - 145 mmol/L Final   • Potassium 06/22/2022 4.0  3.5 - 5.2 mmol/L Final   • Chloride 06/22/2022 107  98 - 107 mmol/L Final   • CO2 06/22/2022 25.4  22.0 - 29.0 mmol/L Final   • Calcium 06/22/2022 9.4  8.6 - 10.5 mg/dL Final   • Total Protein 06/22/2022 6.9  6.0 - 8.5 g/dL Final   • Albumin 06/22/2022 3.60  3.50 - 5.20 g/dL Final   • ALT (SGPT) 06/22/2022 48 (A) 1 - 41 U/L Final   • AST (SGOT) 06/22/2022 72 (A) 1 - 40 U/L Final   • Alkaline Phosphatase 06/22/2022 147 (A) 39 - 117 U/L Final   • Total Bilirubin 06/22/2022 1.4 (A) 0.0 - 1.2 mg/dL Final   • Globulin 06/22/2022 3.3  gm/dL Final   • A/G Ratio 06/22/2022 1.1  g/dL Final   • BUN/Creatinine Ratio 06/22/2022 23.4  7.0 - 25.0 Final   • Anion Gap 06/22/2022 7.6  5.0 - 15.0 mmol/L Final   • eGFR 06/22/2022 107.0  >60.0 mL/min/1.73 Final     National Kidney Foundation and American Society of Nephrology (ASN) Task Force recommended calculation based on the Chronic Kidney Disease Epidemiology Collaboration (CKD-EPI) equation refit without adjustment for race.   • WBC 06/22/2022 4.44  3.40 - 10.80 10*3/mm3 Final   • RBC 06/22/2022 3.93 (A) 4.14 - 5.80 10*6/mm3 Final   • Hemoglobin 06/22/2022 13.6  13.0 - 17.7 g/dL Final   • Hematocrit 06/22/2022 38.1  37.5 - 51.0 % Final   • MCV 06/22/2022 96.9  79.0 - 97.0 fL Final   • MCH 06/22/2022 34.6 (A) 26.6 - 33.0 pg Final   • MCHC 06/22/2022 35.7  31.5 - 35.7 g/dL Final   • RDW 06/22/2022 12.2 (A) 12.3 - 15.4 % Final   • RDW-SD 06/22/2022 42.6  37.0 - 54.0 fl Final   • Platelets 06/22/2022 92 (A) 140 - 450 10*3/mm3 Final   • TSH 06/22/2022 2.350  0.270 - 4.200 uIU/mL Final   • proBNP 06/22/2022 23.5  0.0 - 900.0 pg/mL Final                  Class 2 Severe Obesity (BMI >=35 and <=39.9). Obesity-related health conditions include the following: dyslipidemias, GERD and osteoarthritis. Obesity is unchanged. BMI is is above average; BMI management plan is completed. We discussed portion control and increasing exercise.           Assessment and Plan    Diagnoses and all orders for this visit:    1. Portal hypertension (HCC) (Primary)  Comments:  Continue propanolol    2. Chronic hepatitis C without hepatic coma (HCC)  Comments:  cont lactulose, rec cut back to 15ml daily d/t diarrhea.  See gastro later this week  Orders:  -     lactulose (CHRONULAC) 10 GM/15ML solution; Take 15 mL by mouth Daily.  Dispense: 450 mL; Refill: 5    3. Esophageal varices without bleeding, unspecified esophageal varices type (HCC)  Comments:  Continue propanolol, see gastroenterology for appointment later this week    4. Currently attempting to quit smoking    5. Chronic obstructive pulmonary disease, unspecified COPD type (HCC)  Comments:  Insurance denied Breztri. ordering Symbicort, pt to notify if unable to get medication. rec Smoking  cessation   Orders:  -     CT Chest Low Dose Wo; Future    6. Gastroesophageal reflux disease with esophagitis without hemorrhage  Comments:  Recommend start omeprazole    7. Cigarette nicotine dependence without complication  Comments:  Still working on quitting, has cut back from 4 packs/day to 1-3 cigs/day.   Orders:  -     CT Chest Low Dose Wo; Future    8. Other insomnia  Comments:  Recommend trial of amitriptyline, pt has tried and failed Seroquel in the past    9. Current moderate episode of major depressive disorder without prior episode (HCC)  Comments:  No SSRI due to liver function, trial amitriptyline    Other orders  -     budesonide-formoterol (Symbicort) 160-4.5 MCG/ACT inhaler; Inhale 2 puffs 2 (Two) Times a Day.  Dispense: 10.2 g; Refill: 11  -     omeprazole (priLOSEC) 40 MG capsule; Take 1 capsule by mouth Daily.  Dispense: 30 capsule; Refill: 5  -     amitriptyline (ELAVIL) 10 MG tablet; Take 1 tablet by mouth Every Night. For depression/insomnia  Dispense: 30 tablet; Refill: 2    Colonoscopy will be addressed at gastroenterology later this week  Recommend Shingrix vaccine if okay with gastroenterology    I spent 30 minutes caring for Enio Mcleod on this date of service. This time includes time spent by me in the following activities: preparing for the visit, reviewing tests, performing a medically appropriate examination and/or evaluation , counseling and educating the patient/family/caregiver, ordering medications, tests, or procedures and documenting information in the medical record        Follow Up     Return in about 3 months (around 11/22/2022) for Recheck hep C, COPD, anxiety.  Patient was given instructions and counseling regarding his condition or for health maintenance advice. Please see specific information pulled into the AVS if appropriate.        Part of this note may be an electronic transcription/translation of spoken language to printed text using the Dragon Dictation  System

## 2022-08-26 ENCOUNTER — OFFICE (OUTPATIENT)
Dept: URBAN - METROPOLITAN AREA CLINIC 64 | Facility: CLINIC | Age: 62
End: 2022-08-26
Payer: COMMERCIAL

## 2022-08-26 VITALS
HEIGHT: 72 IN | HEART RATE: 54 BPM | SYSTOLIC BLOOD PRESSURE: 123 MMHG | WEIGHT: 295 LBS | DIASTOLIC BLOOD PRESSURE: 65 MMHG

## 2022-08-26 DIAGNOSIS — I85.01 ESOPHAGEAL VARICES WITH BLEEDING: ICD-10-CM

## 2022-08-26 DIAGNOSIS — B18.2 CHRONIC VIRAL HEPATITIS C: ICD-10-CM

## 2022-08-26 PROCEDURE — 99215 OFFICE O/P EST HI 40 MIN: CPT | Performed by: INTERNAL MEDICINE

## 2022-08-29 ENCOUNTER — TELEPHONE (OUTPATIENT)
Dept: FAMILY MEDICINE CLINIC | Facility: CLINIC | Age: 62
End: 2022-08-29

## 2022-08-29 NOTE — TELEPHONE ENCOUNTER
Pt called in regarding letter he received from [facility] stating he had an appt with Gastroenterology which he has already went to, also a scheduled electrocardiogram?  I did not see anything in his chart regarding this other than one that was done back in march?  Please advise

## 2022-08-29 NOTE — TELEPHONE ENCOUNTER
He had a follow-up with gastroenterology at the end of last week, they likely scheduled an EGD. Does he have a question? Should just follow-up with gastro as directed

## 2022-08-29 NOTE — TELEPHONE ENCOUNTER
Called pt read response from provider.  Pt was just wondering who would have ordered ECG will call gastro and talk to them.

## 2022-08-30 ENCOUNTER — TRANSCRIBE ORDERS (OUTPATIENT)
Dept: ADMINISTRATIVE | Facility: HOSPITAL | Age: 62
End: 2022-08-30

## 2022-08-30 DIAGNOSIS — K76.82 ENCEPHALOPATHY, HEPATIC: Primary | ICD-10-CM

## 2022-09-07 ENCOUNTER — APPOINTMENT (OUTPATIENT)
Dept: ULTRASOUND IMAGING | Facility: HOSPITAL | Age: 62
End: 2022-09-07

## 2022-09-08 ENCOUNTER — HOSPITAL ENCOUNTER (OUTPATIENT)
Dept: CT IMAGING | Facility: HOSPITAL | Age: 62
Discharge: HOME | End: 2022-09-08

## 2022-09-08 ENCOUNTER — HOSPITAL ENCOUNTER (OUTPATIENT)
Dept: ULTRASOUND IMAGING | Facility: HOSPITAL | Age: 62
Discharge: HOME | End: 2022-09-08

## 2022-09-08 DIAGNOSIS — J44.9 CHRONIC OBSTRUCTIVE PULMONARY DISEASE, UNSPECIFIED COPD TYPE: ICD-10-CM

## 2022-09-08 DIAGNOSIS — F17.210 CIGARETTE NICOTINE DEPENDENCE WITHOUT COMPLICATION: ICD-10-CM

## 2022-09-08 DIAGNOSIS — K76.82 ENCEPHALOPATHY, HEPATIC: ICD-10-CM

## 2022-09-08 PROCEDURE — 71271 CT THORAX LUNG CANCER SCR C-: CPT

## 2022-09-08 PROCEDURE — 76705 ECHO EXAM OF ABDOMEN: CPT

## 2022-09-16 ENCOUNTER — TRANSCRIBE ORDERS (OUTPATIENT)
Dept: ADMINISTRATIVE | Facility: HOSPITAL | Age: 62
End: 2022-09-16

## 2022-09-16 DIAGNOSIS — R77.2 ELEVATED ALPHA FETOPROTEIN: Primary | ICD-10-CM

## 2022-09-21 DIAGNOSIS — F41.9 ANXIETY: ICD-10-CM

## 2022-09-21 RX ORDER — HYDROXYZINE HYDROCHLORIDE 10 MG/1
10 TABLET, FILM COATED ORAL EVERY 8 HOURS PRN
Qty: 30 TABLET | Refills: 0 | Status: SHIPPED | OUTPATIENT
Start: 2022-09-21 | End: 2022-10-24 | Stop reason: SDUPTHER

## 2022-09-29 ENCOUNTER — ON CAMPUS - OUTPATIENT (OUTPATIENT)
Dept: URBAN - METROPOLITAN AREA HOSPITAL 2 | Facility: HOSPITAL | Age: 62
End: 2022-09-29
Payer: COMMERCIAL

## 2022-09-29 ENCOUNTER — OFFICE (OUTPATIENT)
Dept: URBAN - METROPOLITAN AREA PATHOLOGY 4 | Facility: PATHOLOGY | Age: 62
End: 2022-09-29
Payer: COMMERCIAL

## 2022-09-29 VITALS
RESPIRATION RATE: 12 BRPM | SYSTOLIC BLOOD PRESSURE: 135 MMHG | DIASTOLIC BLOOD PRESSURE: 112 MMHG | WEIGHT: 297 LBS | HEART RATE: 89 BPM | OXYGEN SATURATION: 97 % | DIASTOLIC BLOOD PRESSURE: 79 MMHG | HEART RATE: 76 BPM | DIASTOLIC BLOOD PRESSURE: 69 MMHG | HEART RATE: 52 BPM | TEMPERATURE: 97.5 F | HEART RATE: 66 BPM | HEART RATE: 73 BPM | OXYGEN SATURATION: 100 % | SYSTOLIC BLOOD PRESSURE: 194 MMHG | SYSTOLIC BLOOD PRESSURE: 165 MMHG | OXYGEN SATURATION: 98 % | OXYGEN SATURATION: 92 % | RESPIRATION RATE: 16 BRPM | DIASTOLIC BLOOD PRESSURE: 85 MMHG | HEIGHT: 72 IN | SYSTOLIC BLOOD PRESSURE: 128 MMHG | DIASTOLIC BLOOD PRESSURE: 68 MMHG | SYSTOLIC BLOOD PRESSURE: 167 MMHG | RESPIRATION RATE: 18 BRPM

## 2022-09-29 DIAGNOSIS — I85.01 ESOPHAGEAL VARICES WITH BLEEDING: ICD-10-CM

## 2022-09-29 DIAGNOSIS — K25.9 GASTRIC ULCER, UNSPECIFIED AS ACUTE OR CHRONIC, WITHOUT HEMO: ICD-10-CM

## 2022-09-29 DIAGNOSIS — K31.89 OTHER DISEASES OF STOMACH AND DUODENUM: ICD-10-CM

## 2022-09-29 DIAGNOSIS — I85.00 ESOPHAGEAL VARICES WITHOUT BLEEDING: ICD-10-CM

## 2022-09-29 DIAGNOSIS — K29.70 GASTRITIS, UNSPECIFIED, WITHOUT BLEEDING: ICD-10-CM

## 2022-09-29 DIAGNOSIS — K21.00 GASTRO-ESOPHAGEAL REFLUX DISEASE WITH ESOPHAGITIS, WITHOUT B: ICD-10-CM

## 2022-09-29 DIAGNOSIS — K22.2 ESOPHAGEAL OBSTRUCTION: ICD-10-CM

## 2022-09-29 DIAGNOSIS — K44.9 DIAPHRAGMATIC HERNIA WITHOUT OBSTRUCTION OR GANGRENE: ICD-10-CM

## 2022-09-29 PROBLEM — K20.80 OTHER ESOPHAGITIS WITHOUT BLEEDING: Status: ACTIVE | Noted: 2022-09-29

## 2022-09-29 LAB
GI HISTOLOGY: A. UNSPECIFIED: (no result)
GI HISTOLOGY: PDF REPORT: (no result)

## 2022-09-29 PROCEDURE — 43239 EGD BIOPSY SINGLE/MULTIPLE: CPT | Performed by: INTERNAL MEDICINE

## 2022-09-29 PROCEDURE — 88305 TISSUE EXAM BY PATHOLOGIST: CPT | Performed by: INTERNAL MEDICINE

## 2022-09-29 PROCEDURE — 88342 IMHCHEM/IMCYTCHM 1ST ANTB: CPT | Performed by: INTERNAL MEDICINE

## 2022-09-29 RX ORDER — PANTOPRAZOLE SODIUM 40 MG/1
40 TABLET, DELAYED RELEASE ORAL
Qty: 30 | Refills: 11 | Status: ACTIVE
Start: 2022-09-29

## 2022-09-30 PROBLEM — I85.10 ESOPHAGEAL VARICES IN CIRRHOSIS: Status: ACTIVE | Noted: 2022-09-29

## 2022-10-24 DIAGNOSIS — I85.00 ESOPHAGEAL VARICES WITHOUT BLEEDING, UNSPECIFIED ESOPHAGEAL VARICES TYPE: ICD-10-CM

## 2022-10-24 DIAGNOSIS — F41.9 ANXIETY: ICD-10-CM

## 2022-10-24 RX ORDER — HYDROXYZINE HYDROCHLORIDE 10 MG/1
10 TABLET, FILM COATED ORAL EVERY 8 HOURS PRN
Qty: 30 TABLET | Refills: 5 | Status: SHIPPED | OUTPATIENT
Start: 2022-10-24 | End: 2023-03-14 | Stop reason: SDUPTHER

## 2022-10-24 RX ORDER — PROPRANOLOL HYDROCHLORIDE 20 MG/1
20 TABLET ORAL 2 TIMES DAILY
Qty: 60 TABLET | Refills: 5 | Status: SHIPPED | OUTPATIENT
Start: 2022-10-24

## 2022-10-24 NOTE — TELEPHONE ENCOUNTER
Caller: Shani Enio BARRY    Relationship: Self    Best call back number: [phone]    Requested Prescriptions:   Requested Prescriptions     Pending Prescriptions Disp Refills   • propranolol (INDERAL) 20 MG tablet 60 tablet 5     Sig: Take 1 tablet by mouth 2 (Two) Times a Day.   • hydrOXYzine (ATARAX) 10 MG tablet 30 tablet 0     Sig: Take 1 tablet by mouth Every 8 (Eight) Hours As Needed for Anxiety.        Pharmacy where request should be sent: [facility] - [facility] [address]  [phone] [facility] [phone] FX     Additional details provided by patient: PATIENT IS OUT OF PROPRANOLOL AND IS GETTING LOW ON HYDROXYZINE.    Does the patient have less than a 3 day supply:  [x] Yes  [] No    Kb Jones Rep   10/24/22 11:36 EDT

## 2022-10-31 ENCOUNTER — OFFICE (OUTPATIENT)
Dept: URBAN - METROPOLITAN AREA CLINIC 64 | Facility: CLINIC | Age: 62
End: 2022-10-31
Payer: COMMERCIAL

## 2022-10-31 VITALS
WEIGHT: 297 LBS | HEIGHT: 72 IN | SYSTOLIC BLOOD PRESSURE: 134 MMHG | DIASTOLIC BLOOD PRESSURE: 72 MMHG | HEART RATE: 53 BPM

## 2022-10-31 DIAGNOSIS — R77.2 ABNORMALITY OF ALPHAFETOPROTEIN: ICD-10-CM

## 2022-10-31 DIAGNOSIS — B18.2 CHRONIC VIRAL HEPATITIS C: ICD-10-CM

## 2022-10-31 DIAGNOSIS — I85.01 ESOPHAGEAL VARICES WITH BLEEDING: ICD-10-CM

## 2022-10-31 DIAGNOSIS — K70.30 ALCOHOLIC CIRRHOSIS OF LIVER WITHOUT ASCITES: ICD-10-CM

## 2022-10-31 PROCEDURE — 99214 OFFICE O/P EST MOD 30 MIN: CPT | Performed by: INTERNAL MEDICINE

## 2022-11-05 ENCOUNTER — APPOINTMENT (OUTPATIENT)
Dept: MRI IMAGING | Facility: HOSPITAL | Age: 62
End: 2022-11-05

## 2023-03-14 ENCOUNTER — TELEPHONE (OUTPATIENT)
Dept: FAMILY MEDICINE CLINIC | Facility: CLINIC | Age: 63
End: 2023-03-14
Payer: MEDICAID

## 2023-03-14 DIAGNOSIS — J44.1 COPD WITH EXACERBATION: ICD-10-CM

## 2023-03-14 DIAGNOSIS — F41.9 ANXIETY: ICD-10-CM

## 2023-03-14 NOTE — TELEPHONE ENCOUNTER
Contacted patient to discuss missed appt on 3/9/23. He did not have transportation. I briefly went over the no-show policy with him and he indicated understanding.     He is scheduled for a DOX visit on 5/3/23. Patient was requesting rx refills which will be sent in another message.

## 2023-03-15 RX ORDER — ALBUTEROL SULFATE 90 UG/1
2 AEROSOL, METERED RESPIRATORY (INHALATION) EVERY 4 HOURS PRN
Qty: 18 G | Refills: 5 | Status: SHIPPED | OUTPATIENT
Start: 2023-03-15

## 2023-03-15 RX ORDER — HYDROXYZINE HYDROCHLORIDE 10 MG/1
10 TABLET, FILM COATED ORAL EVERY 8 HOURS PRN
Qty: 30 TABLET | Refills: 5 | Status: SHIPPED | OUTPATIENT
Start: 2023-03-15

## 2023-05-01 ENCOUNTER — TELEPHONE (OUTPATIENT)
Dept: FAMILY MEDICINE CLINIC | Facility: CLINIC | Age: 63
End: 2023-05-01
Payer: MEDICAID

## 2023-05-01 NOTE — TELEPHONE ENCOUNTER
Attempted to call pt to confirm 5/3 appt, no answer: per verbal left msg for pt to call office to confirm

## 2023-05-03 ENCOUNTER — TELEPHONE (OUTPATIENT)
Dept: FAMILY MEDICINE CLINIC | Facility: CLINIC | Age: 63
End: 2023-05-03

## 2023-05-03 NOTE — TELEPHONE ENCOUNTER
Attempted to reach pt twice today to confirm video vist. First attempted to reach pt around 12:30, pt did not answer, left vm asking pt to call back to confirm video visit. Called pt at appt time, 1:30pm, to get pt checked in and ready for video visit. Pt did not answer, left vm asking pt to reschedule for office visit.

## 2023-05-05 ENCOUNTER — TELEPHONE (OUTPATIENT)
Dept: FAMILY MEDICINE CLINIC | Facility: CLINIC | Age: 63
End: 2023-05-05
Payer: MEDICAID

## 2023-05-05 NOTE — TELEPHONE ENCOUNTER
OKAY FOR HUB TO READ/RESCHEDULE    Attempted to contact patient in r/t missed appointment on 05/03/2023- to inform of, provide overview of no-show policy, and offer to reschedule appointment. [facility] answer[facility]     Will be sending patient letter regarding by mail, which I also said in VM.

## 2023-05-24 ENCOUNTER — OFFICE VISIT (OUTPATIENT)
Dept: FAMILY MEDICINE CLINIC | Facility: CLINIC | Age: 63
End: 2023-05-24
Payer: MEDICAID

## 2023-05-24 VITALS
HEART RATE: 53 BPM | BODY MASS INDEX: 38.87 KG/M2 | WEIGHT: 287 LBS | DIASTOLIC BLOOD PRESSURE: 80 MMHG | SYSTOLIC BLOOD PRESSURE: 124 MMHG | OXYGEN SATURATION: 98 % | HEIGHT: 72 IN

## 2023-05-24 DIAGNOSIS — Z00.00 PREVENTATIVE HEALTH CARE: ICD-10-CM

## 2023-05-24 DIAGNOSIS — Z12.5 SCREENING FOR MALIGNANT NEOPLASM OF PROSTATE: ICD-10-CM

## 2023-05-24 DIAGNOSIS — Z86.19 HISTORY OF HEPATITIS C: Primary | ICD-10-CM

## 2023-05-24 DIAGNOSIS — I85.11 SECONDARY ESOPHAGEAL VARICES WITH BLEEDING: Chronic | ICD-10-CM

## 2023-05-24 DIAGNOSIS — I85.00 ESOPHAGEAL VARICES WITHOUT BLEEDING, UNSPECIFIED ESOPHAGEAL VARICES TYPE: ICD-10-CM

## 2023-05-24 DIAGNOSIS — J44.9 CHRONIC OBSTRUCTIVE PULMONARY DISEASE, UNSPECIFIED COPD TYPE: ICD-10-CM

## 2023-05-24 DIAGNOSIS — K92.1 BLACK STOOLS: ICD-10-CM

## 2023-05-24 DIAGNOSIS — F41.9 ANXIETY: ICD-10-CM

## 2023-05-24 PROCEDURE — 83036 HEMOGLOBIN GLYCOSYLATED A1C: CPT | Performed by: NURSE PRACTITIONER

## 2023-05-24 PROCEDURE — G0103 PSA SCREENING: HCPCS | Performed by: NURSE PRACTITIONER

## 2023-05-24 PROCEDURE — 80050 GENERAL HEALTH PANEL: CPT | Performed by: NURSE PRACTITIONER

## 2023-05-24 PROCEDURE — 80061 LIPID PANEL: CPT | Performed by: NURSE PRACTITIONER

## 2023-05-24 RX ORDER — BUDESONIDE AND FORMOTEROL FUMARATE DIHYDRATE 160; 4.5 UG/1; UG/1
2 AEROSOL RESPIRATORY (INHALATION)
Qty: 10.2 G | Refills: 11 | Status: SHIPPED | OUTPATIENT
Start: 2023-05-24

## 2023-05-24 RX ORDER — HYDROXYZINE HYDROCHLORIDE 10 MG/1
10 TABLET, FILM COATED ORAL EVERY 8 HOURS PRN
Qty: 30 TABLET | Refills: 5 | Status: SHIPPED | OUTPATIENT
Start: 2023-05-24

## 2023-05-24 RX ORDER — PROPRANOLOL HYDROCHLORIDE 20 MG/1
20 TABLET ORAL 2 TIMES DAILY
Qty: 60 TABLET | Refills: 5 | Status: SHIPPED | OUTPATIENT
Start: 2023-05-24

## 2023-05-24 RX ORDER — ALBUTEROL SULFATE 90 UG/1
2 AEROSOL, METERED RESPIRATORY (INHALATION) EVERY 4 HOURS PRN
Qty: 18 G | Refills: 5 | Status: SHIPPED | OUTPATIENT
Start: 2023-05-24

## 2023-05-24 RX ORDER — OMEPRAZOLE 40 MG/1
40 CAPSULE, DELAYED RELEASE ORAL DAILY
Qty: 30 CAPSULE | Refills: 5 | Status: SHIPPED | OUTPATIENT
Start: 2023-05-24

## 2023-05-24 NOTE — ASSESSMENT & PLAN NOTE
1.  Patient advised to take lactulose daily as prescribed  2.  Needs to schedule follow-up with GI

## 2023-05-24 NOTE — ASSESSMENT & PLAN NOTE
1.  Restart omeprazole 40 mg daily, prescription sent to pharmacy  2.  Patient needs to follow-up with GI

## 2023-05-24 NOTE — ASSESSMENT & PLAN NOTE
1.  Resent Symbicort 2 puffs twice daily to pharmacy  2.  If PA is denied, will order nebulizer and budesonide treatments  3.  Albuterol as needed  4.  Smoking cessation encouraged

## 2023-05-24 NOTE — PROGRESS NOTES
"Chief Complaint  Rectal Bleeding (Black colored stool. Not every day. Has chronic Hep C/brain fog and feels like he is forgetting a lot of things. )    Subjective        Enio Mcleod presents to [facility]  History of Present Illness    Patient presents with black colored stool intermittently for a few months. He denies abdominal pain or rectal bleeding. Patient has history of chronic hepatitis C with esophageal varices and portal hypertension.  He has been evaluated by GI, prescribed propanolol and lactulose. Patient does not take Lactulose daily d/t abdominal cramping. He is not scheduled for f/u with GI.     Patient has COPD, prescribed Symbicort but insurance would not cover. Insurance also denied Breztri. Patient has intermittent cough, wheezing and exertional dyspnea. He does have allergies, stable with hydroxyzine PRN - patient also used for anxiety.       Objective   Vital Signs:  /80 (BP Location: Left arm, Patient Position: Sitting, Cuff Size: Large Adult)   Pulse 53   Ht 182.9 cm (72\")   Wt 130 kg (287 lb)   SpO2 98%   BMI 38.92 kg/m²   Estimated body mass index is 38.92 kg/m² as calculated from the following:    Height as of this encounter: 182.9 cm (72\").    Weight as of this encounter: 130 kg (287 lb).             Physical Exam  Constitutional:       Appearance: Normal appearance.   HENT:      Head: Normocephalic.   Cardiovascular:      Rate and Rhythm: Normal rate and regular rhythm.   Pulmonary:      Effort: Pulmonary effort is normal.      Breath sounds: Examination of the right-lower field reveals wheezing. Examination of the left-lower field reveals wheezing. Wheezing present.   Abdominal:      General: Abdomen is flat. Bowel sounds are normal.      Palpations: Abdomen is soft.   Musculoskeletal:         General: Normal range of motion.      Cervical back: Neck supple.      Right lower leg: No edema.      Left lower leg: No edema.   Skin:     General: " Skin is warm and dry.   Neurological:      Mental Status: He is alert and oriented to person, place, and time.      Gait: Gait is intact.   Psychiatric:         Attention and Perception: Attention normal.         Mood and Affect: Mood normal.         Speech: Speech normal.        Result Review :    CMP        6/22/2022    15:29   CMP   Glucose 108     BUN 15     Creatinine 0.64     EGFR 107.0     Sodium 140     Potassium 4.0     Chloride 107     Calcium 9.4     Total Protein 6.9     Albumin 3.60     Globulin 3.3     Total Bilirubin 1.4     Alkaline Phosphatase 147     AST (SGOT) 72     ALT (SGPT) 48     Albumin/Globulin Ratio 1.1     BUN/Creatinine Ratio 23.4     Anion Gap 7.6       CBC        6/22/2022    15:29   CBC   WBC 4.44     RBC 3.93     Hemoglobin 13.6     Hematocrit 38.1     MCV 96.9     MCH 34.6     MCHC 35.7     RDW 12.2     Platelets 92       Lipid Panel        6/22/2022    15:29   Lipid Panel   Total Cholesterol 137     Triglycerides 69     HDL Cholesterol 73     VLDL Cholesterol 14     LDL Cholesterol  50     LDL/HDL Ratio 0.69       TSH        6/22/2022    15:29   TSH   TSH 2.350                        Assessment and Plan   Diagnoses and all orders for this visit:    1. History of hepatitis C (Primary)  Assessment & Plan:  1.  Patient advised to take lactulose daily as prescribed  2.  Needs to schedule follow-up with GI      2. Secondary esophageal varices with bleeding  Assessment & Plan:  1.  Restart omeprazole 40 mg daily, prescription sent to pharmacy  2.  Patient needs to follow-up with GI      3. Anxiety  Assessment & Plan:  1.  Hydroxyzine as needed, refill sent to pharmacy    Orders:  -     hydrOXYzine (ATARAX) 10 MG tablet; Take 1 tablet by mouth Every 8 (Eight) Hours As Needed for Anxiety.  Dispense: 30 tablet; Refill: 5    4. Esophageal varices without bleeding, unspecified esophageal varices type  -     propranolol (INDERAL) 20 MG tablet; Take 1 tablet by mouth 2 (Two) Times a Day.   Dispense: 60 tablet; Refill: 5    5. Preventative health care  -     CBC (No Diff)  -     Comprehensive Metabolic Panel  -     Hemoglobin A1c  -     Lipid Panel  -     TSH  -     PSA Screen    6. Screening for malignant neoplasm of prostate  -     PSA Screen    7. Chronic obstructive pulmonary disease, unspecified COPD type  Assessment & Plan:  1.  Resent Symbicort 2 puffs twice daily to pharmacy  2.  If PA is denied, will order nebulizer and budesonide treatments  3.  Albuterol as needed  4.  Smoking cessation encouraged    Orders:  -     albuterol sulfate  (90 Base) MCG/ACT inhaler; Inhale 2 puffs Every 4 (Four) Hours As Needed for Wheezing or Shortness of Air.  Dispense: 18 g; Refill: 5    8. Black stools  Assessment & Plan:  1.  Patient is not up-to-date on colonoscopy  2.  He needs to call today and schedule follow-up visit with GI to address new symptoms  3.  Labs today      Other orders  -     omeprazole (priLOSEC) 40 MG capsule; Take 1 capsule by mouth Daily. Indications: Esophagus Inflammation with Erosion, Gastroesophageal Reflux Disease, Esophageal Varices  Dispense: 30 capsule; Refill: 5  -     budesonide-formoterol (Symbicort) 160-4.5 MCG/ACT inhaler; Inhale 2 puffs 2 (Two) Times a Day. Indications: Chronic Obstructive Lung Disease  Dispense: 10.2 g; Refill: 11         I spent 30 minutes caring for Enio on this date of service. This time includes time spent by me in the following activities:preparing for the visit, reviewing tests, obtaining and/or reviewing a separately obtained history, performing a medically appropriate examination and/or evaluation , counseling and educating the patient/family/caregiver, ordering medications, tests, or procedures, documenting information in the medical record and care coordination  Follow Up   Return in about 3 months (around 8/24/2023) for COPD.  Patient was given instructions and counseling regarding his condition or for health maintenance advice. Please  see specific information pulled into the AVS if appropriate.

## 2023-05-24 NOTE — PROGRESS NOTES
Venipuncture Blood Specimen Collection  Venipuncture performed in the right arm by Danuta Wills MA with good hemostasis. Patient tolerated the procedure well without complications.   05/24/23   Danuta Wills MA

## 2023-05-24 NOTE — ASSESSMENT & PLAN NOTE
1.  Patient is not up-to-date on colonoscopy  2.  He needs to call today and schedule follow-up visit with GI to address new symptoms  3.  Labs today

## 2023-05-25 LAB
ALBUMIN SERPL-MCNC: 3.3 G/DL (ref 3.5–5.2)
ALBUMIN/GLOB SERPL: 0.9 G/DL
ALP SERPL-CCNC: 112 U/L (ref 39–117)
ALT SERPL W P-5'-P-CCNC: 52 U/L (ref 1–41)
ANION GAP SERPL CALCULATED.3IONS-SCNC: 7.5 MMOL/L (ref 5–15)
AST SERPL-CCNC: 67 U/L (ref 1–40)
BILIRUB SERPL-MCNC: 2.1 MG/DL (ref 0–1.2)
BUN SERPL-MCNC: 11 MG/DL (ref 8–23)
BUN/CREAT SERPL: 22.4 (ref 7–25)
CALCIUM SPEC-SCNC: 9.3 MG/DL (ref 8.6–10.5)
CHLORIDE SERPL-SCNC: 109 MMOL/L (ref 98–107)
CHOLEST SERPL-MCNC: 133 MG/DL (ref 0–200)
CO2 SERPL-SCNC: 25.5 MMOL/L (ref 22–29)
CREAT SERPL-MCNC: 0.49 MG/DL (ref 0.76–1.27)
DEPRECATED RDW RBC AUTO: 46.1 FL (ref 37–54)
EGFRCR SERPLBLD CKD-EPI 2021: 115.3 ML/MIN/1.73
ERYTHROCYTE [DISTWIDTH] IN BLOOD BY AUTOMATED COUNT: 12.9 % (ref 12.3–15.4)
GLOBULIN UR ELPH-MCNC: 3.6 GM/DL
GLUCOSE SERPL-MCNC: 117 MG/DL (ref 65–99)
HBA1C MFR BLD: 5 % (ref 4.8–5.6)
HCT VFR BLD AUTO: 40.6 % (ref 37.5–51)
HDLC SERPL-MCNC: 60 MG/DL (ref 40–60)
HGB BLD-MCNC: 14.4 G/DL (ref 13–17.7)
LDLC SERPL CALC-MCNC: 61 MG/DL (ref 0–100)
LDLC/HDLC SERPL: 1.04 {RATIO}
MCH RBC QN AUTO: 34.7 PG (ref 26.6–33)
MCHC RBC AUTO-ENTMCNC: 35.5 G/DL (ref 31.5–35.7)
MCV RBC AUTO: 97.8 FL (ref 79–97)
PLATELET # BLD AUTO: 98 10*3/MM3 (ref 140–450)
PMV BLD AUTO: 14.3 FL (ref 6–12)
POTASSIUM SERPL-SCNC: 4.3 MMOL/L (ref 3.5–5.2)
PROT SERPL-MCNC: 6.9 G/DL (ref 6–8.5)
PSA SERPL-MCNC: 0.16 NG/ML (ref 0–4)
RBC # BLD AUTO: 4.15 10*6/MM3 (ref 4.14–5.8)
SODIUM SERPL-SCNC: 142 MMOL/L (ref 136–145)
TRIGL SERPL-MCNC: 53 MG/DL (ref 0–150)
TSH SERPL DL<=0.05 MIU/L-ACNC: 2.92 UIU/ML (ref 0.27–4.2)
VLDLC SERPL-MCNC: 12 MG/DL (ref 5–40)
WBC NRBC COR # BLD: 4.41 10*3/MM3 (ref 3.4–10.8)

## 2023-05-25 NOTE — PROGRESS NOTES
Please let patient know that his hemoglobin is stable but platelet count remains low.  Liver enzymes are elevated as is his bilirubin.  Patient needs follow-up with GI regarding liver function and black stools which can indicate rectal bleeding

## 2023-06-22 PROBLEM — K74.69 CIRRHOSIS OF LIVER, OTHER: Status: ACTIVE | Noted: 2022-09-29

## 2023-08-23 ENCOUNTER — TELEPHONE (OUTPATIENT)
Dept: FAMILY MEDICINE CLINIC | Facility: CLINIC | Age: 63
End: 2023-08-23
Payer: MEDICAID

## 2023-08-23 NOTE — TELEPHONE ENCOUNTER
Attempted to call pt to confirm 8/24 appt, no answer: per verbal left msg for pt to call office to confirm or if needing to reschedule

## 2023-09-06 ENCOUNTER — TELEPHONE (OUTPATIENT)
Dept: FAMILY MEDICINE CLINIC | Facility: CLINIC | Age: 63
End: 2023-09-06
Payer: MEDICAID

## 2023-09-06 NOTE — TELEPHONE ENCOUNTER
Attempted to call pt r/t no show appt 8/24/23, no answer: per verbal left msg with no show policy, if pt wants to reschedule appt to call office. Sending letter with no show policy

## (undated) DEVICE — BITEBLOCK ENDO W/STRAP 60F A/ LF DISP

## (undated) DEVICE — MULTIPLE BAND LIGATOR: Brand: SPEEDBAND SUPERVIEW SUPER 7

## (undated) DEVICE — PAPR PRNT PK SONY W RIBN UPC55

## (undated) DEVICE — PK ENDO GI 50